# Patient Record
Sex: FEMALE | Race: WHITE | NOT HISPANIC OR LATINO | Employment: OTHER | URBAN - METROPOLITAN AREA
[De-identification: names, ages, dates, MRNs, and addresses within clinical notes are randomized per-mention and may not be internally consistent; named-entity substitution may affect disease eponyms.]

---

## 2023-09-30 ENCOUNTER — APPOINTMENT (OUTPATIENT)
Dept: CT IMAGING | Facility: HOSPITAL | Age: 88
DRG: 563 | End: 2023-09-30
Payer: MEDICARE

## 2023-09-30 ENCOUNTER — APPOINTMENT (OUTPATIENT)
Dept: GENERAL RADIOLOGY | Facility: HOSPITAL | Age: 88
DRG: 563 | End: 2023-09-30
Payer: MEDICARE

## 2023-09-30 ENCOUNTER — HOSPITAL ENCOUNTER (INPATIENT)
Facility: HOSPITAL | Age: 88
LOS: 4 days | Discharge: NURSING, REHAB OR LONG-TERM CARE | DRG: 563 | End: 2023-10-06
Attending: EMERGENCY MEDICINE | Admitting: INTERNAL MEDICINE
Payer: MEDICARE

## 2023-09-30 DIAGNOSIS — S42.291A OTHER CLOSED DISPLACED FRACTURE OF PROXIMAL END OF RIGHT HUMERUS, INITIAL ENCOUNTER: Primary | ICD-10-CM

## 2023-09-30 DIAGNOSIS — S09.90XA CLOSED HEAD INJURY, INITIAL ENCOUNTER: ICD-10-CM
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
## 2023-09-30 DIAGNOSIS — I10 UNCONTROLLED HYPERTENSION: ICD-10-CM

## 2023-09-30 PROBLEM — S42.209A PROXIMAL HUMERUS FRACTURE: Status: ACTIVE | Noted: 2023-09-30

## 2023-09-30 LAB
ALBUMIN SERPL-MCNC: 3.7 G/DL (ref 3.5–5.2)
ALBUMIN/GLOB SERPL: 1.2 G/DL
ALP SERPL-CCNC: 71 U/L (ref 39–117)
ALT SERPL W P-5'-P-CCNC: 10 U/L (ref 1–33)
ANION GAP SERPL CALCULATED.3IONS-SCNC: 10.3 MMOL/L (ref 5–15)
AST SERPL-CCNC: 16 U/L (ref 1–32)
BASOPHILS # BLD AUTO: 0.04 10*3/MM3 (ref 0–0.2)
BASOPHILS NFR BLD AUTO: 0.4 % (ref 0–1.5)
BILIRUB SERPL-MCNC: 0.3 MG/DL (ref 0–1.2)
BUN SERPL-MCNC: 21 MG/DL (ref 8–23)
BUN/CREAT SERPL: 15.3 (ref 7–25)
CALCIUM SPEC-SCNC: 8.8 MG/DL (ref 8.2–9.6)
CHLORIDE SERPL-SCNC: 105 MMOL/L (ref 98–107)
CO2 SERPL-SCNC: 25.7 MMOL/L (ref 22–29)
CREAT SERPL-MCNC: 1.37 MG/DL (ref 0.57–1)
DEPRECATED RDW RBC AUTO: 44.1 FL (ref 37–54)
EGFRCR SERPLBLD CKD-EPI 2021: 35.9 ML/MIN/1.73
EOSINOPHIL # BLD AUTO: 0.15 10*3/MM3 (ref 0–0.4)
EOSINOPHIL NFR BLD AUTO: 1.6 % (ref 0.3–6.2)
ERYTHROCYTE [DISTWIDTH] IN BLOOD BY AUTOMATED COUNT: 13.3 % (ref 12.3–15.4)
GLOBULIN UR ELPH-MCNC: 3.1 GM/DL
GLUCOSE SERPL-MCNC: 127 MG/DL (ref 65–99)
HCT VFR BLD AUTO: 40.8 % (ref 34–46.6)
HGB BLD-MCNC: 13.7 G/DL (ref 12–15.9)
IMM GRANULOCYTES # BLD AUTO: 0.05 10*3/MM3 (ref 0–0.05)
IMM GRANULOCYTES NFR BLD AUTO: 0.5 % (ref 0–0.5)
LYMPHOCYTES # BLD AUTO: 1.36 10*3/MM3 (ref 0.7–3.1)
LYMPHOCYTES NFR BLD AUTO: 14.5 % (ref 19.6–45.3)
MCH RBC QN AUTO: 30.3 PG (ref 26.6–33)
MCHC RBC AUTO-ENTMCNC: 33.6 G/DL (ref 31.5–35.7)
MCV RBC AUTO: 90.3 FL (ref 79–97)
MONOCYTES # BLD AUTO: 0.65 10*3/MM3 (ref 0.1–0.9)
MONOCYTES NFR BLD AUTO: 6.9 % (ref 5–12)
NEUTROPHILS NFR BLD AUTO: 7.16 10*3/MM3 (ref 1.7–7)
NEUTROPHILS NFR BLD AUTO: 76.1 % (ref 42.7–76)
NRBC BLD AUTO-RTO: 0 /100 WBC (ref 0–0.2)
PLATELET # BLD AUTO: 245 10*3/MM3 (ref 140–450)
PMV BLD AUTO: 10.4 FL (ref 6–12)
POTASSIUM SERPL-SCNC: 3.7 MMOL/L (ref 3.5–5.2)
PROT SERPL-MCNC: 6.8 G/DL (ref 6–8.5)
RBC # BLD AUTO: 4.52 10*6/MM3 (ref 3.77–5.28)
SODIUM SERPL-SCNC: 141 MMOL/L (ref 136–145)
WBC NRBC COR # BLD: 9.41 10*3/MM3 (ref 3.4–10.8)

## 2023-09-30 PROCEDURE — 80053 COMPREHEN METABOLIC PANEL: CPT | Performed by: PHYSICIAN ASSISTANT

## 2023-09-30 PROCEDURE — 72125 CT NECK SPINE W/O DYE: CPT

## 2023-09-30 PROCEDURE — G0378 HOSPITAL OBSERVATION PER HR: HCPCS

## 2023-09-30 PROCEDURE — 73030 X-RAY EXAM OF SHOULDER: CPT

## 2023-09-30 PROCEDURE — 25010000002 HYDROMORPHONE PER 4 MG: Performed by: INTERNAL MEDICINE

## 2023-09-30 PROCEDURE — 99285 EMERGENCY DEPT VISIT HI MDM: CPT

## 2023-09-30 PROCEDURE — 85025 COMPLETE CBC W/AUTO DIFF WBC: CPT | Performed by: PHYSICIAN ASSISTANT

## 2023-09-30 PROCEDURE — 73060 X-RAY EXAM OF HUMERUS: CPT

## 2023-09-30 PROCEDURE — 70450 CT HEAD/BRAIN W/O DYE: CPT

## 2023-09-30 RX ORDER — HYDROCODONE BITARTRATE AND ACETAMINOPHEN 5; 325 MG/1; MG/1
1 TABLET ORAL EVERY 4 HOURS PRN
Status: DISCONTINUED | OUTPATIENT
Start: 2023-09-30 | End: 2023-10-06 | Stop reason: HOSPADM

## 2023-09-30 RX ORDER — NALOXONE HCL 0.4 MG/ML
0.4 VIAL (ML) INJECTION
Status: DISCONTINUED | OUTPATIENT
Start: 2023-09-30 | End: 2023-10-02

## 2023-09-30 RX ORDER — HYDROCODONE BITARTRATE AND ACETAMINOPHEN 5; 325 MG/1; MG/1
1 TABLET ORAL ONCE
Status: COMPLETED | OUTPATIENT
Start: 2023-09-30 | End: 2023-09-30

## 2023-09-30 RX ORDER — ONDANSETRON 2 MG/ML
4 INJECTION INTRAMUSCULAR; INTRAVENOUS EVERY 6 HOURS PRN
Status: DISCONTINUED | OUTPATIENT
Start: 2023-09-30 | End: 2023-10-06 | Stop reason: HOSPADM

## 2023-09-30 RX ORDER — AMOXICILLIN 250 MG
2 CAPSULE ORAL 2 TIMES DAILY
Status: DISCONTINUED | OUTPATIENT
Start: 2023-09-30 | End: 2023-10-06 | Stop reason: HOSPADM

## 2023-09-30 RX ORDER — POLYETHYLENE GLYCOL 3350 17 G/17G
17 POWDER, FOR SOLUTION ORAL DAILY PRN
Status: DISCONTINUED | OUTPATIENT
Start: 2023-09-30 | End: 2023-10-06 | Stop reason: HOSPADM

## 2023-09-30 RX ORDER — BISACODYL 5 MG/1
5 TABLET, DELAYED RELEASE ORAL DAILY PRN
Status: DISCONTINUED | OUTPATIENT
Start: 2023-09-30 | End: 2023-10-06 | Stop reason: HOSPADM

## 2023-09-30 RX ORDER — UREA 10 %
3 LOTION (ML) TOPICAL NIGHTLY PRN
Status: DISCONTINUED | OUTPATIENT
Start: 2023-09-30 | End: 2023-10-06 | Stop reason: HOSPADM

## 2023-09-30 RX ORDER — SODIUM CHLORIDE 0.9 % (FLUSH) 0.9 %
10 SYRINGE (ML) INJECTION AS NEEDED
Status: DISCONTINUED | OUTPATIENT
Start: 2023-09-30 | End: 2023-10-06 | Stop reason: HOSPADM

## 2023-09-30 RX ORDER — SODIUM CHLORIDE 9 MG/ML
75 INJECTION, SOLUTION INTRAVENOUS CONTINUOUS
Status: DISCONTINUED | OUTPATIENT
Start: 2023-09-30 | End: 2023-09-30

## 2023-09-30 RX ORDER — ONDANSETRON 4 MG/1
4 TABLET, FILM COATED ORAL EVERY 6 HOURS PRN
Status: DISCONTINUED | OUTPATIENT
Start: 2023-09-30 | End: 2023-10-06 | Stop reason: HOSPADM

## 2023-09-30 RX ORDER — HYDRALAZINE HYDROCHLORIDE 50 MG/1
50 TABLET, FILM COATED ORAL 2 TIMES DAILY
COMMUNITY

## 2023-09-30 RX ORDER — HYDRALAZINE HYDROCHLORIDE 50 MG/1
50 TABLET, FILM COATED ORAL 2 TIMES DAILY
Status: DISCONTINUED | OUTPATIENT
Start: 2023-09-30 | End: 2023-10-01

## 2023-09-30 RX ORDER — HYDROMORPHONE HYDROCHLORIDE 1 MG/ML
0.5 INJECTION, SOLUTION INTRAMUSCULAR; INTRAVENOUS; SUBCUTANEOUS
Status: DISCONTINUED | OUTPATIENT
Start: 2023-09-30 | End: 2023-10-02

## 2023-09-30 RX ORDER — BISACODYL 10 MG
10 SUPPOSITORY, RECTAL RECTAL DAILY PRN
Status: DISCONTINUED | OUTPATIENT
Start: 2023-09-30 | End: 2023-10-06 | Stop reason: HOSPADM

## 2023-09-30 RX ORDER — ACETAMINOPHEN 325 MG/1
650 TABLET ORAL EVERY 4 HOURS PRN
Status: DISCONTINUED | OUTPATIENT
Start: 2023-09-30 | End: 2023-10-06 | Stop reason: HOSPADM

## 2023-09-30 RX ADMIN — HYDROCODONE BITARTRATE AND ACETAMINOPHEN 1 TABLET: 5; 325 TABLET ORAL at 19:07

## 2023-09-30 RX ADMIN — HYDROMORPHONE HYDROCHLORIDE 0.5 MG: 1 INJECTION, SOLUTION INTRAMUSCULAR; INTRAVENOUS; SUBCUTANEOUS at 23:28

## 2023-09-30 RX ADMIN — HYDRALAZINE HYDROCHLORIDE 50 MG: 50 TABLET, FILM COATED ORAL at 23:28

## 2023-09-30 NOTE — ED PROVIDER NOTES
MD ATTESTATION NOTE    The JACQUI and I have discussed this patient's history, physical exam, and treatment plan.  I have reviewed the documentation and personally had a face to face interaction with the patient. I affirm the documentation and agree with the treatment and plan.  The attached note describes my personal findings.      I provided a substantive portion of the care of the patient.  I personally performed the physical exam in its entirety, and below are my findings.      Brief HPI: 89-year-old who had a witnessed fall at her memory care unit.  She complains of right shoulder pain.  They are unsure if she hit her head.    General : 89-year-old patient is awake with significant dementia  HEENT: NCAT no C-spine tenderness  Ext: Swelling to the right shoulder with bruising to right humerus: The other 3 extremities have full range of motion without pain to movement or palpation  Skin: No rash  Neuro: Awake and demented with a nonfocal neuro exam  Psych: Normal mood and affect      Plan: We will CT head, C-spine, right shoulder and right humerus    1900: The patient's daughter just got here and states she only takes a baby aspirin a day but no blood thinners.      Patient CT head and C-spine are negative.  The patient's right shoulder film shows an impacted right proximal humerus fracture with angulation.  We will place the patient in a sling.  The patient does usually ambulate with a walker but I do not believe she will be able to do so with this type of fracture.  We will admit her to the hospital for orthopedics consultation as well as PT.  The patient and daughter understand and agree with the plan.     Nicola Esteban MD  09/30/23 2035

## 2023-09-30 NOTE — ED NOTES
Pt to ED from [facility] assisted living/memory care for witnessed fall, pt had a sweater hanging on her walker and tripped on it. Right shoulder pain, unsure if struck head

## 2023-09-30 NOTE — ED PROVIDER NOTES
EMERGENCY DEPARTMENT ENCOUNTER    Room Number:  02/02  PCP: Serina Hannon APRN  Discussed/ obtained information from independent historians: EMS and daughter      HPI:  Chief Complaint: Fall with right shoulder pain  A complete HPI/ROS/PMH/PSH/SH/FH are unobtainable due to: Dementia  Context: Joanna DEAL is a 89 y.o. female who presents to the ED c/o fall with right shoulder pain.  Patient presents from [facility].  She was walking with her walker and had her sweater hanging on the walker.  She tripped over the sweater and fell forward.  Unknown if patient hit head or have loss of consciousness.  Patient complains of right shoulder pain.  Daughter reports she takes a baby aspirin.  History otherwise limited secondary to dementia.      External (non-ED) record review:   Reviewed note from office visit with orthopedic surgery on 11/24/2021 where patient seen after partial hip replacement.  Reviewed assessment and plan.  Patient to continue physical therapy, follow-up for recheck.  Reviewed prior laboratory studies.  Most recent CBC with hemoglobin 14.5.  Most recent CMP with creatinine 1.25.      PAST MEDICAL HISTORY  Active Ambulatory Problems     Diagnosis Date Noted    No Active Ambulatory Problems     Resolved Ambulatory Problems     Diagnosis Date Noted    No Resolved Ambulatory Problems     No Additional Past Medical History         PAST SURGICAL HISTORY  No past surgical history on file.      FAMILY HISTORY  No family history on file.      SOCIAL HISTORY  Social History     Socioeconomic History    Marital status: Unknown         ALLERGIES  Cephalexin        REVIEW OF SYSTEMS  Review of Systems   Unable to perform ROS: Dementia          PHYSICAL EXAM  ED Triage Vitals [09/30/23 1835]   Temp Heart Rate Resp BP SpO2   97.2 °F (36.2 °C) 76 22 (!) 183/97 95 %      Temp src Heart Rate Source Patient Position BP Location FiO2 (%)   Tympanic Monitor Sitting Left arm --       Physical  Exam  Constitutional:       General: She is not in acute distress.     Appearance: She is well-developed.   HENT:      Head: Normocephalic and atraumatic.   Eyes:      Extraocular Movements: Extraocular movements intact.   Cardiovascular:      Rate and Rhythm: Normal rate and regular rhythm.      Pulses:           Radial pulses are 2+ on the right side and 2+ on the left side.        Dorsalis pedis pulses are 2+ on the right side and 2+ on the left side.      Heart sounds: Normal heart sounds.   Pulmonary:      Effort: Pulmonary effort is normal.      Breath sounds: Normal breath sounds.   Abdominal:      General: There is no distension.   Musculoskeletal:      Comments: Right shoulder and humerus are diffusely tender to palpation.  No deformity noted.   Skin:     General: Skin is warm.   Neurological:      General: No focal deficit present.      Mental Status: She is alert and oriented to person, place, and time.   Psychiatric:         Mood and Affect: Mood normal.         Vital signs and nursing notes reviewed.          LAB RESULTS  Recent Results (from the past 24 hour(s))   Comprehensive Metabolic Panel    Collection Time: 09/30/23  8:33 PM    Specimen: Blood   Result Value Ref Range    Glucose 127 (H) 65 - 99 mg/dL    BUN 21 8 - 23 mg/dL    Creatinine 1.37 (H) 0.57 - 1.00 mg/dL    Sodium 141 136 - 145 mmol/L    Potassium 3.7 3.5 - 5.2 mmol/L    Chloride 105 98 - 107 mmol/L    CO2 25.7 22.0 - 29.0 mmol/L    Calcium 8.8 8.2 - 9.6 mg/dL    Total Protein 6.8 6.0 - 8.5 g/dL    Albumin 3.7 3.5 - 5.2 g/dL    ALT (SGPT) 10 1 - 33 U/L    AST (SGOT) 16 1 - 32 U/L    Alkaline Phosphatase 71 39 - 117 U/L    Total Bilirubin 0.3 0.0 - 1.2 mg/dL    Globulin 3.1 gm/dL    A/G Ratio 1.2 g/dL    BUN/Creatinine Ratio 15.3 7.0 - 25.0    Anion Gap 10.3 5.0 - 15.0 mmol/L    eGFR 35.9 (L) >60.0 mL/min/1.73   CBC Auto Differential    Collection Time: 09/30/23  8:33 PM    Specimen: Blood   Result Value Ref Range    WBC 9.41 3.40 - 10.80  10*3/mm3    RBC 4.52 3.77 - 5.28 10*6/mm3    Hemoglobin 13.7 12.0 - 15.9 g/dL    Hematocrit 40.8 34.0 - 46.6 %    MCV 90.3 79.0 - 97.0 fL    MCH 30.3 26.6 - 33.0 pg    MCHC 33.6 31.5 - 35.7 g/dL    RDW 13.3 12.3 - 15.4 %    RDW-SD 44.1 37.0 - 54.0 fl    MPV 10.4 6.0 - 12.0 fL    Platelets 245 140 - 450 10*3/mm3    Neutrophil % 76.1 (H) 42.7 - 76.0 %    Lymphocyte % 14.5 (L) 19.6 - 45.3 %    Monocyte % 6.9 5.0 - 12.0 %    Eosinophil % 1.6 0.3 - 6.2 %    Basophil % 0.4 0.0 - 1.5 %    Immature Grans % 0.5 0.0 - 0.5 %    Neutrophils, Absolute 7.16 (H) 1.70 - 7.00 10*3/mm3    Lymphocytes, Absolute 1.36 0.70 - 3.10 10*3/mm3    Monocytes, Absolute 0.65 0.10 - 0.90 10*3/mm3    Eosinophils, Absolute 0.15 0.00 - 0.40 10*3/mm3    Basophils, Absolute 0.04 0.00 - 0.20 10*3/mm3    Immature Grans, Absolute 0.05 0.00 - 0.05 10*3/mm3    nRBC 0.0 0.0 - 0.2 /100 WBC       Ordered the above labs and reviewed the results.        RADIOLOGY  XR Shoulder 2+ View Right, XR Humerus Right    Result Date: 9/30/2023  X-RAY OF THE RIGHT HUMERUS; 2 VIEWS RIGHT SHOULDER  HISTORY: Pain  COMPARISON: None available.  FINDINGS: The patient has an impacted fracture of the proximal right humerus. No fracture of the distal humerus is seen. There is adjacent soft tissue swelling. Degenerative changes are noted at the right AC joint.      Impacted proximal right humeral fracture.  This report was finalized on 9/30/2023 7:45 PM by Dr. Katty Simmons M.D.      CT Head Without Contrast, CT Cervical Spine Without Contrast    Result Date: 9/30/2023  EXAM: CT HEAD WO CONTRAST-, CT CERVICAL SPINE WO CONTRAST-  CLINICAL HISTORY: Fall with possible head injury  TECHNIQUE: CT scan of the head was obtained with 3 mm axial soft tissue algorithm images. No intravenous contrast was administered. Sagittal and coronal reconstructions were obtained.  COMPARISON: None available.  FINDINGS: Tiny right frontal subcutaneous hematoma. No underlying calvarial fracture or  intracranial hemorrhage. Generalized cortical involution without lobar predominance. Ex vacuo dilatation of the lateral and third ventricles without hydrocephalus. Confluent periventricular and subcortical white matter hypoattenuation without mass effect, suggesting advanced small vessel ischemic changes. No CT evidence of acute territorial infarction. No midline shift or mass effect. No extra-axial collections. Clear visualized portions of the paranasal sinuses and mastoid air cells.  Straightening of the normal cervical lordosis. Slight C4 on C5 anterolisthesis. Vertebral body heights are maintained without fracture. Moderate and advanced disc degeneration throughout the cervical spine, advanced at C5-C7. Bilateral multilevel moderate advanced facet arthropathy. No appreciable spinal canal stenosis. No prevertebral soft tissue swelling.       1. Tiny right frontal subcutaneous hematoma. No underlying calvarial fracture or intracranial hemorrhage. 2. No cervical spine fracture or traumatic subluxation. Degenerative changes as above.     Radiation dose reduction techniques were utilized, including automated exposure control and exposure modulation based on body size.  This report was finalized on 9/30/2023 8:01 PM by Dr. Panfilo Bravo M.D.       Ordered the above noted radiological studies. Reviewed by me in PACS.              MEDICATIONS GIVEN IN ER  Medications   HYDROcodone-acetaminophen (NORCO) 5-325 MG per tablet 1 tablet (has no administration in time range)             MEDICAL DECISION MAKING, PROGRESS, and CONSULTS    All labs have been independently reviewed by me.  All radiology studies have been reviewed by me and I have also reviewed the radiology report.   EKG's independently viewed and interpreted by me.  Discussion below represents my analysis of pertinent findings related to patient's condition, differential diagnosis, treatment plan and final disposition.            Orders placed during this  visit:  Orders Placed This Encounter   Procedures    CT Head Without Contrast    CT Cervical Spine Without Contrast    XR Shoulder 2+ View Right    XR Humerus Right           Differential diagnosis:  Right shoulder fracture, right shoulder dislocation, right elbow fracture, closed head injury, intracranial hemorrhage      Independent interpretation of labs, radiology studies, and discussions with consultants:  ED Course as of 10/01/23 0956   Sat Sep 30, 2023   1925 X-ray right shoulder independently interpreted me as proximal right humerus fracture [MP]   1926 X-ray of humerus independently interpreted by me as displaced proximal right humerus fracture [MP]   1935 CT scan of head independently inter by me as no acute hemorrhage [MP]   1936 CT scan of cervical spine independently interpreted by me as no acute fracture [MP]   2015 I spoke with Dr. Dutton with [facility].  Reviewed patient presentation and ED findings.  She agrees admit patient to a [facility] bed. [MP]      ED Course User Index  [MP] Maame Ram PA-C         Additional orders considered but not ordered:  CT of thoracic spine      Additional sources:    - Chronic or social conditions impacting care: Dementia          DIAGNOSIS  Final diagnoses:   Other closed displaced fracture of proximal end of right humerus, initial encounter   Fall, initial encounter   Closed head injury, initial encounter   Uncontrolled hypertension             Latest Documented Vital Signs:  As of 19:00 EDT  BP- (!) 183/97 HR- 76 Temp- 97.2 °F (36.2 °C) (Tympanic) O2 sat- 95%              --    Please note that portions of this were completed with a voice recognition program.       Note Disclaimer: At [facility], we believe that sharing information builds trust and better relationships. You are receiving this note because you are receiving care at [facility] or recently visited. It is possible you will see health information before a provider has talked with you about it.  This kind of information can be easy to misunderstand. To help you fully understand what it means for your health, we urge you to discuss this note with your provider.             Maame Ram PA-C  10/01/23 0959

## 2023-10-01 PROBLEM — M25.521 PAIN IN JOINT, UPPER ARM, RIGHT: Status: ACTIVE | Noted: 2023-10-01

## 2023-10-01 PROBLEM — I10 HTN (HYPERTENSION): Status: ACTIVE | Noted: 2023-10-01

## 2023-10-01 PROBLEM — F03.918 DEMENTIA WITH BEHAVIORAL DISTURBANCE: Status: ACTIVE | Noted: 2023-10-01

## 2023-10-01 LAB
ANION GAP SERPL CALCULATED.3IONS-SCNC: 10 MMOL/L (ref 5–15)
BUN SERPL-MCNC: 21 MG/DL (ref 8–23)
BUN/CREAT SERPL: 17.6 (ref 7–25)
CALCIUM SPEC-SCNC: 8.6 MG/DL (ref 8.2–9.6)
CHLORIDE SERPL-SCNC: 107 MMOL/L (ref 98–107)
CO2 SERPL-SCNC: 23 MMOL/L (ref 22–29)
CREAT SERPL-MCNC: 1.19 MG/DL (ref 0.57–1)
DEPRECATED RDW RBC AUTO: 44.2 FL (ref 37–54)
EGFRCR SERPLBLD CKD-EPI 2021: 42.5 ML/MIN/1.73
ERYTHROCYTE [DISTWIDTH] IN BLOOD BY AUTOMATED COUNT: 13.2 % (ref 12.3–15.4)
GLUCOSE SERPL-MCNC: 123 MG/DL (ref 65–99)
HCT VFR BLD AUTO: 36.9 % (ref 34–46.6)
HGB BLD-MCNC: 12.4 G/DL (ref 12–15.9)
MCH RBC QN AUTO: 30.5 PG (ref 26.6–33)
MCHC RBC AUTO-ENTMCNC: 33.6 G/DL (ref 31.5–35.7)
MCV RBC AUTO: 90.7 FL (ref 79–97)
PLATELET # BLD AUTO: 238 10*3/MM3 (ref 140–450)
PMV BLD AUTO: 10.7 FL (ref 6–12)
POTASSIUM SERPL-SCNC: 3.8 MMOL/L (ref 3.5–5.2)
RBC # BLD AUTO: 4.07 10*6/MM3 (ref 3.77–5.28)
SODIUM SERPL-SCNC: 140 MMOL/L (ref 136–145)
WBC NRBC COR # BLD: 8.82 10*3/MM3 (ref 3.4–10.8)

## 2023-10-01 PROCEDURE — 80048 BASIC METABOLIC PNL TOTAL CA: CPT | Performed by: INTERNAL MEDICINE

## 2023-10-01 PROCEDURE — 25010000002 HYDROMORPHONE PER 4 MG: Performed by: INTERNAL MEDICINE

## 2023-10-01 PROCEDURE — 36415 COLL VENOUS BLD VENIPUNCTURE: CPT | Performed by: INTERNAL MEDICINE

## 2023-10-01 PROCEDURE — G0378 HOSPITAL OBSERVATION PER HR: HCPCS

## 2023-10-01 PROCEDURE — 25010000002 HYDRALAZINE PER 20 MG: Performed by: INTERNAL MEDICINE

## 2023-10-01 PROCEDURE — 85027 COMPLETE CBC AUTOMATED: CPT | Performed by: INTERNAL MEDICINE

## 2023-10-01 PROCEDURE — 25010000002 ENOXAPARIN PER 10 MG: Performed by: HOSPITALIST

## 2023-10-01 RX ORDER — DILTIAZEM HYDROCHLORIDE 60 MG/1
120 TABLET, FILM COATED ORAL DAILY
Status: DISCONTINUED | OUTPATIENT
Start: 2023-10-02 | End: 2023-10-06 | Stop reason: HOSPADM

## 2023-10-01 RX ORDER — LEVOTHYROXINE SODIUM 0.05 MG/1
50 TABLET ORAL DAILY
COMMUNITY

## 2023-10-01 RX ORDER — DOCUSATE SODIUM 100 MG/1
100 CAPSULE, LIQUID FILLED ORAL 2 TIMES DAILY
Status: DISCONTINUED | OUTPATIENT
Start: 2023-10-01 | End: 2023-10-06 | Stop reason: HOSPADM

## 2023-10-01 RX ORDER — DOCUSATE SODIUM 100 MG/1
100 CAPSULE, LIQUID FILLED ORAL 2 TIMES DAILY
COMMUNITY

## 2023-10-01 RX ORDER — DILTIAZEM HYDROCHLORIDE 120 MG/1
120 CAPSULE, EXTENDED RELEASE ORAL 2 TIMES DAILY
COMMUNITY

## 2023-10-01 RX ORDER — ASPIRIN 81 MG/1
81 TABLET ORAL NIGHTLY
COMMUNITY

## 2023-10-01 RX ORDER — ATORVASTATIN CALCIUM 80 MG/1
80 TABLET, FILM COATED ORAL NIGHTLY
COMMUNITY

## 2023-10-01 RX ORDER — LOSARTAN POTASSIUM 25 MG/1
25 TABLET ORAL
Status: DISCONTINUED | OUTPATIENT
Start: 2023-10-01 | End: 2023-10-06 | Stop reason: HOSPADM

## 2023-10-01 RX ORDER — FAMOTIDINE 20 MG/1
20 TABLET, FILM COATED ORAL DAILY
Status: DISCONTINUED | OUTPATIENT
Start: 2023-10-02 | End: 2023-10-06 | Stop reason: HOSPADM

## 2023-10-01 RX ORDER — ATORVASTATIN CALCIUM 20 MG/1
80 TABLET, FILM COATED ORAL NIGHTLY
Status: DISCONTINUED | OUTPATIENT
Start: 2023-10-01 | End: 2023-10-06 | Stop reason: HOSPADM

## 2023-10-01 RX ORDER — FAMOTIDINE 20 MG/1
20 TABLET, FILM COATED ORAL
Status: DISCONTINUED | OUTPATIENT
Start: 2023-10-01 | End: 2023-10-01

## 2023-10-01 RX ORDER — HYDRALAZINE HYDROCHLORIDE 25 MG/1
25 TABLET, FILM COATED ORAL EVERY 6 HOURS SCHEDULED
Status: DISCONTINUED | OUTPATIENT
Start: 2023-10-01 | End: 2023-10-04

## 2023-10-01 RX ORDER — ENOXAPARIN SODIUM 100 MG/ML
30 INJECTION SUBCUTANEOUS EVERY 24 HOURS
Status: DISCONTINUED | OUTPATIENT
Start: 2023-10-01 | End: 2023-10-06 | Stop reason: HOSPADM

## 2023-10-01 RX ORDER — LEVOTHYROXINE SODIUM 0.05 MG/1
50 TABLET ORAL DAILY
Status: DISCONTINUED | OUTPATIENT
Start: 2023-10-01 | End: 2023-10-06 | Stop reason: HOSPADM

## 2023-10-01 RX ORDER — ASPIRIN 81 MG/1
81 TABLET ORAL NIGHTLY
Status: DISCONTINUED | OUTPATIENT
Start: 2023-10-01 | End: 2023-10-06 | Stop reason: HOSPADM

## 2023-10-01 RX ORDER — DILTIAZEM HYDROCHLORIDE 240 MG/1
240 CAPSULE, COATED, EXTENDED RELEASE ORAL
Status: DISCONTINUED | OUTPATIENT
Start: 2023-10-01 | End: 2023-10-01

## 2023-10-01 RX ORDER — HYDRALAZINE HYDROCHLORIDE 20 MG/ML
10 INJECTION INTRAMUSCULAR; INTRAVENOUS ONCE
Status: COMPLETED | OUTPATIENT
Start: 2023-10-01 | End: 2023-10-01

## 2023-10-01 RX ADMIN — ASPIRIN 81 MG: 81 TABLET, COATED ORAL at 20:00

## 2023-10-01 RX ADMIN — HYDRALAZINE HYDROCHLORIDE 25 MG: 25 TABLET ORAL at 12:09

## 2023-10-01 RX ADMIN — HYDROMORPHONE HYDROCHLORIDE 0.5 MG: 1 INJECTION, SOLUTION INTRAMUSCULAR; INTRAVENOUS; SUBCUTANEOUS at 05:43

## 2023-10-01 RX ADMIN — FAMOTIDINE 20 MG: 20 TABLET, FILM COATED ORAL at 08:55

## 2023-10-01 RX ADMIN — SENNOSIDES AND DOCUSATE SODIUM 2 TABLET: 50; 8.6 TABLET ORAL at 08:55

## 2023-10-01 RX ADMIN — ATORVASTATIN CALCIUM 80 MG: 20 TABLET, FILM COATED ORAL at 20:00

## 2023-10-01 RX ADMIN — LEVOTHYROXINE SODIUM 50 MCG: 50 TABLET ORAL at 18:12

## 2023-10-01 RX ADMIN — HYDRALAZINE HYDROCHLORIDE 25 MG: 25 TABLET ORAL at 18:12

## 2023-10-01 RX ADMIN — HYDROCODONE BITARTRATE AND ACETAMINOPHEN 1 TABLET: 5; 325 TABLET ORAL at 18:18

## 2023-10-01 RX ADMIN — DOCUSATE SODIUM 100 MG: 100 CAPSULE, LIQUID FILLED ORAL at 20:00

## 2023-10-01 RX ADMIN — LOSARTAN POTASSIUM 25 MG: 25 TABLET, FILM COATED ORAL at 12:09

## 2023-10-01 RX ADMIN — HYDROCODONE BITARTRATE AND ACETAMINOPHEN 1 TABLET: 5; 325 TABLET ORAL at 13:49

## 2023-10-01 RX ADMIN — HYDROCODONE BITARTRATE AND ACETAMINOPHEN 1 TABLET: 5; 325 TABLET ORAL at 08:55

## 2023-10-01 RX ADMIN — ENOXAPARIN SODIUM 30 MG: 100 INJECTION SUBCUTANEOUS at 08:55

## 2023-10-01 RX ADMIN — HYDRALAZINE HYDROCHLORIDE 10 MG: 20 INJECTION, SOLUTION INTRAMUSCULAR; INTRAVENOUS at 07:00

## 2023-10-01 RX ADMIN — HYDROCODONE BITARTRATE AND ACETAMINOPHEN 1 TABLET: 5; 325 TABLET ORAL at 22:42

## 2023-10-01 RX ADMIN — HYDROMORPHONE HYDROCHLORIDE 0.5 MG: 1 INJECTION, SOLUTION INTRAMUSCULAR; INTRAVENOUS; SUBCUTANEOUS at 03:35

## 2023-10-01 RX ADMIN — SENNOSIDES AND DOCUSATE SODIUM 2 TABLET: 50; 8.6 TABLET ORAL at 20:00

## 2023-10-01 NOTE — PLAN OF CARE
Goal Outcome Evaluation:  Plan of Care Reviewed With: patient        Progress: improving  Outcome Evaluation: BP improving with addition of orals, home meds reordered, oriented to self only (baseline), norco for pain, will need snf, PT/OT evals ordered, educated on bp meds and monitoring

## 2023-10-01 NOTE — PROGRESS NOTES
"    DAILY PROGRESS NOTE  [facility]    Patient Identification:  Name: Joanna Johnson  Age: 89 y.o.  Sex: female  :  1929  MRN: [id number]         Primary Care Physician: Serina Hannon APRN    Subjective:  Interval History: History and ROS completely unreliable given patient's pronounced dementia.  She looks at me blankly and tries to have some small talk at times.  Definitely quite confused and I spent more time trying to  and reassure her.  Also present side of the bed up to prevent falls and discussed with RN.  No family currently present at bedside.    Objective:    Scheduled Meds:hydrALAZINE, 25 mg, Oral, Q6H  losartan, 25 mg, Oral, Q24H  senna-docusate sodium, 2 tablet, Oral, BID      Continuous Infusions:     Vital signs in last 24 hours:  Temp:  [97.2 °F (36.2 °C)-98.8 °F (37.1 °C)] 97.6 °F (36.4 °C)  Heart Rate:  [55-76] 69  Resp:  [16-22] 16  BP: (133-190)/() 190/88    Intake/Output:    Intake/Output Summary (Last 24 hours) at 10/1/2023 0829  Last data filed at 10/1/2023 0546  Gross per 24 hour   Intake 60 ml   Output 200 ml   Net -140 ml       Exam:  BP (!) 190/88 (BP Location: Left arm, Patient Position: Lying) Comment: RN notified  Pulse 69   Temp 97.6 °F (36.4 °C) (Oral)   Resp 16   Ht 160 cm (63\")   Wt 62.6 kg (138 lb)   SpO2 96%   BMI 24.45 kg/m²     General Appearance:    Alert, cooperative minimally hampered by mentation, advanced dementia                          Head:    Normocephalic, without obvious abnormality, atraumatic                           Eyes:    PERRL, conjunctivae/corneas clear, EOM's intact, both eyes                         Throat:   Oral mucosa pink and moist                           Neck:   Supple, no TTP or JVD                         Lungs:    Clear to auscultation bilaterally, respirations unlabored                 Chest Wall:    No tenderness or deformity                          Heart:    Regular rate and rhythm, S1 and S2 " normal                  Abdomen:     Soft, nontender, bowel sounds active                 Extremities: Ecchymosis and swelling present with some tenderness to palpation to the right upper extremity, no cyanosis with good pulses                  Neurologic:   CNII-XII intact       Data Review:  Labs in chart were reviewed.    Assessment:  Active Hospital Problems    Diagnosis  POA    **Proximal humerus fracture [S42.209A]  Yes    Pain in joint, upper arm, right [M25.521]  Unknown    Dementia with behavioral disturbance [F03.918]  Unknown    HTN (hypertension) [I10]  Unknown      Resolved Hospital Problems   No resolved problems to display.       Plan:    Right proximal humerus fracture with impaction   -Orthopedics has evaluated -no surgical plans noted   -Discussed increased fall precautions and pain management with RN -transition to p.o. and reserve IV for breakthrough   -Hgb normal at 12.4 -need some formal prophylaxis and will use low-dose Lovenox      HTN   -Labile due to pain   -Avoid over control   -Add losartan 25 mg for now and change hydralazine to 25 mg Q8      Pronounced dementia is definitely going to make management of this fracture much more difficult as well as working with physical therapy      CKD 3a -mild/monitor    Add Pepcid for GI prophylaxis        Disposition -will need assistance of CCP for postoperative rehab/long-term care -PT/OT pending    RN to work on home MAR with facility    Surgery reached out to daughter unsuccessfully today and I will try again tomorrow      Addendum - home MAR R/R - parameters written on Diltiazem - prefer home med supply to avoid normal regimen - RN to inquire if possible     Jasson Willson MD  10/1/2023  08:29 EDT

## 2023-10-01 NOTE — H&P
HISTORY AND PHYSICAL   [facility]        Date of Admission: 2023  Patient Identification:  Name: Joanna Johnson  Age: 89 y.o.  Sex: female  :  1929  MRN: [id number]                     Primary Care Physician: Serina Hannon APRN    Chief Complaint:  89 year old female was sent in from her memory unit after a fall; her sweater was tangled in her walker causing her to lose her balance; she has a history of dementia; a daughter is present; she is having some pain of her right arm    History of Present Illness:   As above    Past Medical History:  Past Medical History:   Diagnosis Date    Hepatitis     Hypertension     Stroke      Past Surgical History:  History reviewed. No pertinent surgical history.   Home Meds:  Medications Prior to Admission   Medication Sig Dispense Refill Last Dose    hydrALAZINE (APRESOLINE) 50 MG tablet Take 1 tablet by mouth 2 (Two) Times a Day.          Allergies:  Allergies   Allergen Reactions    Cephalexin Rash     Extensive morbilliform rash     Immunizations:    There is no immunization history on file for this patient.  Social History:   Social History     Social History Narrative    Not on file     Social History     Socioeconomic History    Marital status:    Tobacco Use    Smoking status: Former     Types: Cigarettes     Passive exposure: Past    Smokeless tobacco: Never   Vaping Use    Vaping Use: Unknown   Substance and Sexual Activity    Alcohol use: Not Currently    Drug use: Never    Sexual activity: Defer       Family History:  History reviewed. No pertinent family history.     Review of Systems  Not obtainable from the patient  Objective:  T Max 24 hrs: Temp (24hrs), Av.7 °F (36.5 °C), Min:97.2 °F (36.2 °C), Max:98.2 °F (36.8 °C)    Vitals Ranges:   Temp:  [97.2 °F (36.2 °C)-98.2 °F (36.8 °C)] 98.2 °F (36.8 °C)  Heart Rate:  [55-76] 62  Resp:  [18-22] 18  BP: (183-187)/() 185/97      Exam:  BP (!) 185/97 (BP Location: Left arm,  "Patient Position: Lying)   Pulse 62   Temp 98.2 °F (36.8 °C) (Axillary)   Resp 18   Ht 160 cm (63\")   Wt 62.6 kg (138 lb)   SpO2 96%   BMI 24.45 kg/m²     General Appearance:    Alert, cooperative, no distress, appears stated age   Head:    Normocephalic, without obvious abnormality, atraumatic   Eyes:    PERRL, conjunctivae/corneas clear, EOM's intact, both eyes   Ears:    Normal external ear canals, both ears   Nose:   Nares normal, septum midline, mucosa normal, no drainage    or sinus tenderness   Throat:   Lips, mucosa, and tongue normal   Neck:   Supple, symmetrical, trachea midline, no adenopathy;     thyroid:  no enlargement/tenderness/nodules; no carotid    bruit or JVD   Back:     Symmetric, no curvature, ROM normal, no CVA tenderness   Lungs:     Decreased breath sounds bilaterally, respirations unlabored   Chest Wall:    No tenderness or deformity    Heart:    Regular rate and rhythm, S1 and S2 normal, no murmur, rub   or gallop   Abdomen:     Soft, nontender, bowel sounds active all four quadrants,     no masses, no hepatomegaly, no splenomegaly   Extremities:   Extremities normal, atraumatic, bruising right upper arm      .    Data Review:  Labs in chart were reviewed.  WBC   Date Value Ref Range Status   09/30/2023 9.41 3.40 - 10.80 10*3/mm3 Final     Hemoglobin   Date Value Ref Range Status   09/30/2023 13.7 12.0 - 15.9 g/dL Final     Hematocrit   Date Value Ref Range Status   09/30/2023 40.8 34.0 - 46.6 % Final     Platelets   Date Value Ref Range Status   09/30/2023 245 140 - 450 10*3/mm3 Final     Sodium   Date Value Ref Range Status   09/30/2023 141 136 - 145 mmol/L Final     Potassium   Date Value Ref Range Status   09/30/2023 3.7 3.5 - 5.2 mmol/L Final     Chloride   Date Value Ref Range Status   09/30/2023 105 98 - 107 mmol/L Final     CO2   Date Value Ref Range Status   09/30/2023 25.7 22.0 - 29.0 mmol/L Final     BUN   Date Value Ref Range Status   09/30/2023 21 8 - 23 mg/dL Final "     Creatinine   Date Value Ref Range Status   09/30/2023 1.37 (H) 0.57 - 1.00 mg/dL Final     Glucose   Date Value Ref Range Status   09/30/2023 127 (H) 65 - 99 mg/dL Final     Calcium   Date Value Ref Range Status   09/30/2023 8.8 8.2 - 9.6 mg/dL Final     AST (SGOT)   Date Value Ref Range Status   09/30/2023 16 1 - 32 U/L Final     ALT (SGPT)   Date Value Ref Range Status   09/30/2023 10 1 - 33 U/L Final     Alkaline Phosphatase   Date Value Ref Range Status   09/30/2023 71 39 - 117 U/L Final                Imaging Results (All)       Procedure Component Value Units Date/Time    CT Head Without Contrast [[id number]] Collected: 09/30/23 1940     Updated: 09/30/23 2004    Narrative:      EXAM: CT HEAD WO CONTRAST-, CT CERVICAL SPINE WO CONTRAST-     CLINICAL HISTORY: Fall with possible head injury     TECHNIQUE: CT scan of the head was obtained with 3 mm axial soft tissue  algorithm images. No intravenous contrast was administered. Sagittal and  coronal reconstructions were obtained.     COMPARISON: None available.     FINDINGS: Tiny right frontal subcutaneous hematoma. No underlying  calvarial fracture or intracranial hemorrhage. Generalized cortical  involution without lobar predominance. Ex vacuo dilatation of the  lateral and third ventricles without hydrocephalus. Confluent  periventricular and subcortical white matter hypoattenuation without  mass effect, suggesting advanced small vessel ischemic changes. No CT  evidence of acute territorial infarction. No midline shift or mass  effect. No extra-axial collections. Clear visualized portions of the  paranasal sinuses and mastoid air cells.     Straightening of the normal cervical lordosis. Slight C4 on C5  anterolisthesis. Vertebral body heights are maintained without fracture.  Moderate and advanced disc degeneration throughout the cervical spine,  advanced at C5-C7. Bilateral multilevel moderate advanced facet  arthropathy. No appreciable spinal canal  stenosis. No prevertebral soft  tissue swelling.           Impression:      1. Tiny right frontal subcutaneous hematoma. No underlying calvarial  fracture or intracranial hemorrhage.  2. No cervical spine fracture or traumatic subluxation. Degenerative  changes as above.              Radiation dose reduction techniques were utilized, including automated  exposure control and exposure modulation based on body size.     This report was finalized on 9/30/2023 8:01 PM by Dr. Panfilo Bravo M.D.       CT Cervical Spine Without Contrast [[id number]] Collected: 09/30/23 1940     Updated: 09/30/23 2004    Narrative:      EXAM: CT HEAD WO CONTRAST-, CT CERVICAL SPINE WO CONTRAST-     CLINICAL HISTORY: Fall with possible head injury     TECHNIQUE: CT scan of the head was obtained with 3 mm axial soft tissue  algorithm images. No intravenous contrast was administered. Sagittal and  coronal reconstructions were obtained.     COMPARISON: None available.     FINDINGS: Tiny right frontal subcutaneous hematoma. No underlying  calvarial fracture or intracranial hemorrhage. Generalized cortical  involution without lobar predominance. Ex vacuo dilatation of the  lateral and third ventricles without hydrocephalus. Confluent  periventricular and subcortical white matter hypoattenuation without  mass effect, suggesting advanced small vessel ischemic changes. No CT  evidence of acute territorial infarction. No midline shift or mass  effect. No extra-axial collections. Clear visualized portions of the  paranasal sinuses and mastoid air cells.     Straightening of the normal cervical lordosis. Slight C4 on C5  anterolisthesis. Vertebral body heights are maintained without fracture.  Moderate and advanced disc degeneration throughout the cervical spine,  advanced at C5-C7. Bilateral multilevel moderate advanced facet  arthropathy. No appreciable spinal canal stenosis. No prevertebral soft  tissue swelling.           Impression:      1.  Tiny right frontal subcutaneous hematoma. No underlying calvarial  fracture or intracranial hemorrhage.  2. No cervical spine fracture or traumatic subluxation. Degenerative  changes as above.              Radiation dose reduction techniques were utilized, including automated  exposure control and exposure modulation based on body size.     This report was finalized on 9/30/2023 8:01 PM by Dr. Panfilo Bravo M.D.       XR Shoulder 2+ View Right [[id number]] Collected: 09/30/23 1943     Updated: 09/30/23 1948    Narrative:      X-RAY OF THE RIGHT HUMERUS; 2 VIEWS RIGHT SHOULDER     HISTORY: Pain     COMPARISON: None available.     FINDINGS:  The patient has an impacted fracture of the proximal right humerus. No  fracture of the distal humerus is seen. There is adjacent soft tissue  swelling. Degenerative changes are noted at the right AC joint.       Impression:      Impacted proximal right humeral fracture.     This report was finalized on 9/30/2023 7:45 PM by Dr. Katty Simmons M.D.       XR Humerus Right [[id number]] Collected: 09/30/23 1943     Updated: 09/30/23 1948    Narrative:      X-RAY OF THE RIGHT HUMERUS; 2 VIEWS RIGHT SHOULDER     HISTORY: Pain     COMPARISON: None available.     FINDINGS:  The patient has an impacted fracture of the proximal right humerus. No  fracture of the distal humerus is seen. There is adjacent soft tissue  swelling. Degenerative changes are noted at the right AC joint.       Impression:      Impacted proximal right humeral fracture.     This report was finalized on 9/30/2023 7:45 PM by Dr. Katty Simmons M.D.                 Assessment:  Active Hospital Problems    Diagnosis  POA    **Proximal humerus fracture [S42.209A]  Yes      Resolved Hospital Problems   No resolved problems to display.   Fall  Hypertension  Hyperglycemia  Ckd3    Plan:  Will ask ortho to see  She will likely require a higher level of care  She was having difficulty using her walker prior to this  fall  Monitor blood sugar   patient, family and ed providers    Jesi Dutton MD  9/30/2023  22:52 EDT

## 2023-10-01 NOTE — ED NOTES
"Nursing report ED to floor  Joanna Johnson  89 y.o.  female    HPI :   Chief Complaint   Patient presents with    Shoulder Pain    Fall       Admitting doctor:   Jesi Dutton MD    Admitting diagnosis:   The primary encounter diagnosis was Other closed displaced fracture of proximal end of right humerus, initial encounter. Diagnoses of Fall, initial encounter, Closed head injury, initial encounter, and Uncontrolled hypertension were also pertinent to this visit.    Code status:   Current Code Status       Date Active Code Status Order ID Comments User Context       Not on file            Allergies:   Cephalexin    Isolation:   No active isolations    Intake and Output  No intake or output data in the 24 hours ending 09/30/23 2037    Weight:       09/30/23 1837   Weight: 62.6 kg (138 lb)       Most recent vitals:   Vitals:    09/30/23 1835 09/30/23 1837   BP: (!) 183/97    BP Location: Left arm    Patient Position: Sitting    Pulse: 76    Resp: 22    Temp: 97.2 °F (36.2 °C)    TempSrc: Tympanic    SpO2: 95%    Weight:  62.6 kg (138 lb)   Height: 160 cm (63\")        Active LDAs/IV Access:   Lines, Drains & Airways       Active LDAs       Name Placement date Placement time Site Days    Peripheral IV 09/30/23 2037 Anterior;Left;Proximal Forearm 09/30/23 2037  Forearm  less than 1                    Labs (abnormal labs have a star):   Labs Reviewed   COMPREHENSIVE METABOLIC PANEL   CBC WITH AUTO DIFFERENTIAL   CBC AND DIFFERENTIAL    Narrative:     The following orders were created for panel order CBC & Differential.  Procedure                               Abnormality         Status                     ---------                               -----------         ------                     CBC Auto Differential[[id number]]                            In process                   Please view results for these tests on the individual orders.       EKG:   No orders to display       Meds given in ED: "   Medications   sodium chloride 0.9 % flush 10 mL (has no administration in time range)   HYDROcodone-acetaminophen (NORCO) 5-325 MG per tablet 1 tablet (1 tablet Oral Given 9/30/23 1907)       Imaging results:  XR Shoulder 2+ View Right    Result Date: 9/30/2023  Impacted proximal right humeral fracture.  This report was finalized on 9/30/2023 7:45 PM by Dr. Katty Simmons M.D.      XR Humerus Right    Result Date: 9/30/2023  Impacted proximal right humeral fracture.  This report was finalized on 9/30/2023 7:45 PM by Dr. Katty Simmons M.D.      CT Head Without Contrast    Result Date: 9/30/2023  1. Tiny right frontal subcutaneous hematoma. No underlying calvarial fracture or intracranial hemorrhage. 2. No cervical spine fracture or traumatic subluxation. Degenerative changes as above.     Radiation dose reduction techniques were utilized, including automated exposure control and exposure modulation based on body size.  This report was finalized on 9/30/2023 8:01 PM by Dr. Panfilo Bravo M.D.      CT Cervical Spine Without Contrast    Result Date: 9/30/2023  1. Tiny right frontal subcutaneous hematoma. No underlying calvarial fracture or intracranial hemorrhage. 2. No cervical spine fracture or traumatic subluxation. Degenerative changes as above.     Radiation dose reduction techniques were utilized, including automated exposure control and exposure modulation based on body size.  This report was finalized on 9/30/2023 8:01 PM by Dr. Panfilo Bravo M.D.       Ambulatory status:   - up with assist x2    Social issues:   Social History     Socioeconomic History    Marital status:        NIH Stroke Scale:       Samanta Roger RN  09/30/23 20:37 EDT

## 2023-10-01 NOTE — CONSULTS
Orthopaedic & Hand Surgery  Shoulder pain consult Note  Dr. Noe Siegel  [phone]    CC: Right shoulder pain    HPI:  Patient is a 89 y.o. female with a history of dementia who presents with right shoulder pain. Of note, the patient gives very weak yes and no answers only occasionally. As result, history is obtained solely from the medical record. Attempts to reach her daughter and POA were unsuccessful.    History obtained 10/1/2023:    Pain is characterized as follows:    Onset: 9/30/2023  Location: Right shoulder  Quality: Sore  Severity: Unable to determine based on patient's lack of communication, however, she does frown and retract when the shoulder is palpated  Modifying factors: Better at rest. Worse with any attempts to move  Context: Sustained injury when she got caught in her walker in the memory care unit where she lives, causing her to lose her balance and fall. She presented to the emergency department at [facility] in [place] where imaging revealed an impacted right proximal humerus fracture. She was admitted for orthopedic evaluation and PT.    She is seen alone in the room today. No family is present. Attempts to call the family were unsuccessful, unfortunately.    MEDICAL HISTORY  Past Medical History:   Diagnosis Date    Hepatitis     Hypertension     Stroke      History reviewed. No pertinent surgical history.  Prior to Admission medications    Medication Sig Start Date End Date Taking? Authorizing Provider   hydrALAZINE (APRESOLINE) 50 MG tablet Take 1 tablet by mouth 2 (Two) Times a Day.    Provider, Osei, MD     Allergies   Allergen Reactions    Cephalexin Rash     Extensive morbilliform rash       There is no immunization history on file for this patient.  Social History     Tobacco Use    Smoking status: Former     Types: Cigarettes     Passive exposure: Past    Smokeless tobacco: Never   Substance Use Topics    Alcohol use: Not Currently      Social History  "    Substance and Sexual Activity   Drug Use Never       REVIEW OF SYSTEMS:  Not obtainable due to patient's mental status    VITALS: BP (!) 190/88 (BP Location: Left arm, Patient Position: Lying) Comment: RN notified  Pulse 69   Temp 97.6 °F (36.4 °C) (Oral)   Resp 16   Ht 160 cm (63\")   Wt 62.6 kg (138 lb)   SpO2 96%   BMI 24.45 kg/m²  Body mass index is 24.45 kg/m².    PHYSICAL EXAM:   CONSTITUTIONAL: No acute distress, lying quietly in bed but minimally responsive to questions. She will occasionally follow commands with heavy prompting. Exam is therefore limited  EXTREMITY: Right Upper Extremity  INSPECTION: Skin warm and well-perfused. Ecchymosis and swelling are present at the level of the shoulder. Tender palpation  Palpation: At the glenohumeral joint but not distally. Nontender medially over the clavicle or scapula.  Vascular: 2+ radial pulse and brisk cap refill to the hand  Sensation: Withdraws from light pressure stimulation to the nails in all digits.  Motor: Seen to flex and extend the thumb and fingers independently.  Range of Motion / Stability: Range of motion of the shoulder is minimal, limited by pain.    RADIOLOGY REVIEW:   XR Shoulder 2+ View Right    Result Date: 9/30/2023  Impacted proximal right humeral fracture.  This report was finalized on 9/30/2023 7:45 PM by Dr. Katty Simmons M.D.      XR Humerus Right    Result Date: 9/30/2023  Impacted proximal right humeral fracture.  This report was finalized on 9/30/2023 7:45 PM by Dr. Katty Simmons M.D.      CT Head Without Contrast    Result Date: 9/30/2023  1. Tiny right frontal subcutaneous hematoma. No underlying calvarial fracture or intracranial hemorrhage. 2. No cervical spine fracture or traumatic subluxation. Degenerative changes as above.     Radiation dose reduction techniques were utilized, including automated exposure control and exposure modulation based on body size.  This report was finalized on 9/30/2023 8:01 PM by " Dr. Panfilo Bravo M.D.      CT Cervical Spine Without Contrast    Result Date: 9/30/2023  1. Tiny right frontal subcutaneous hematoma. No underlying calvarial fracture or intracranial hemorrhage. 2. No cervical spine fracture or traumatic subluxation. Degenerative changes as above.     Radiation dose reduction techniques were utilized, including automated exposure control and exposure modulation based on body size.  This report was finalized on 9/30/2023 8:01 PM by Dr. Panfilo Bravo M.D.       I have independently reviewed the images of the shoulder and humerus and agree with radiologist assessment as noted above.    LABS:   Results for the past 24 hours:   Recent Results (from the past 24 hour(s))   Comprehensive Metabolic Panel    Collection Time: 09/30/23  8:33 PM    Specimen: Blood   Result Value Ref Range    Glucose 127 (H) 65 - 99 mg/dL    BUN 21 8 - 23 mg/dL    Creatinine 1.37 (H) 0.57 - 1.00 mg/dL    Sodium 141 136 - 145 mmol/L    Potassium 3.7 3.5 - 5.2 mmol/L    Chloride 105 98 - 107 mmol/L    CO2 25.7 22.0 - 29.0 mmol/L    Calcium 8.8 8.2 - 9.6 mg/dL    Total Protein 6.8 6.0 - 8.5 g/dL    Albumin 3.7 3.5 - 5.2 g/dL    ALT (SGPT) 10 1 - 33 U/L    AST (SGOT) 16 1 - 32 U/L    Alkaline Phosphatase 71 39 - 117 U/L    Total Bilirubin 0.3 0.0 - 1.2 mg/dL    Globulin 3.1 gm/dL    A/G Ratio 1.2 g/dL    BUN/Creatinine Ratio 15.3 7.0 - 25.0    Anion Gap 10.3 5.0 - 15.0 mmol/L    eGFR 35.9 (L) >60.0 mL/min/1.73   CBC Auto Differential    Collection Time: 09/30/23  8:33 PM    Specimen: Blood   Result Value Ref Range    WBC 9.41 3.40 - 10.80 10*3/mm3    RBC 4.52 3.77 - 5.28 10*6/mm3    Hemoglobin 13.7 12.0 - 15.9 g/dL    Hematocrit 40.8 34.0 - 46.6 %    MCV 90.3 79.0 - 97.0 fL    MCH 30.3 26.6 - 33.0 pg    MCHC 33.6 31.5 - 35.7 g/dL    RDW 13.3 12.3 - 15.4 %    RDW-SD 44.1 37.0 - 54.0 fl    MPV 10.4 6.0 - 12.0 fL    Platelets 245 140 - 450 10*3/mm3    Neutrophil % 76.1 (H) 42.7 - 76.0 %    Lymphocyte % 14.5 (L)  19.6 - 45.3 %    Monocyte % 6.9 5.0 - 12.0 %    Eosinophil % 1.6 0.3 - 6.2 %    Basophil % 0.4 0.0 - 1.5 %    Immature Grans % 0.5 0.0 - 0.5 %    Neutrophils, Absolute 7.16 (H) 1.70 - 7.00 10*3/mm3    Lymphocytes, Absolute 1.36 0.70 - 3.10 10*3/mm3    Monocytes, Absolute 0.65 0.10 - 0.90 10*3/mm3    Eosinophils, Absolute 0.15 0.00 - 0.40 10*3/mm3    Basophils, Absolute 0.04 0.00 - 0.20 10*3/mm3    Immature Grans, Absolute 0.05 0.00 - 0.05 10*3/mm3    nRBC 0.0 0.0 - 0.2 /100 WBC   Basic Metabolic Panel    Collection Time: 10/01/23  4:45 AM    Specimen: Blood   Result Value Ref Range    Glucose 123 (H) 65 - 99 mg/dL    BUN 21 8 - 23 mg/dL    Creatinine 1.19 (H) 0.57 - 1.00 mg/dL    Sodium 140 136 - 145 mmol/L    Potassium 3.8 3.5 - 5.2 mmol/L    Chloride 107 98 - 107 mmol/L    CO2 23.0 22.0 - 29.0 mmol/L    Calcium 8.6 8.2 - 9.6 mg/dL    BUN/Creatinine Ratio 17.6 7.0 - 25.0    Anion Gap 10.0 5.0 - 15.0 mmol/L    eGFR 42.5 (L) >60.0 mL/min/1.73   CBC (No Diff)    Collection Time: 10/01/23  4:45 AM    Specimen: Blood   Result Value Ref Range    WBC 8.82 3.40 - 10.80 10*3/mm3    RBC 4.07 3.77 - 5.28 10*6/mm3    Hemoglobin 12.4 12.0 - 15.9 g/dL    Hematocrit 36.9 34.0 - 46.6 %    MCV 90.7 79.0 - 97.0 fL    MCH 30.5 26.6 - 33.0 pg    MCHC 33.6 31.5 - 35.7 g/dL    RDW 13.2 12.3 - 15.4 %    RDW-SD 44.2 37.0 - 54.0 fl    MPV 10.7 6.0 - 12.0 fL    Platelets 238 140 - 450 10*3/mm3       IMPRESSION:  Patient is a 89 y.o. female with right proximal humerus fracture sustained 9/30/2023    Unfortunately, attempts to contact the family were unsuccessful. Plan from orthopedic perspective would be for sling for comfort with gentle pendulum exercises initially, avoiding attempts at active shoulder abduction for the first several weeks. Okay for light use of the hand, particularly with the elbow resting on the surface but avoid any heavy lift. Would not recommend the use of a walker at this time. Defer dispo planning to primary team  with guidance from the therapy team. From a orthopedic perspective, she may be discharged with follow-up with me in 3 weeks. Orthopedics to sign off at this time. Thank you for allowing us to take part in the care of your patient.    PLAN:   Admited to: Jesi Dutton MD  Diet: Per Primary Team  Weight Bearing:  Right Upper Extremity: Light use of the hand with less than a 1 pound lift  Labs: None additional needed  Imaging: None additional needed  Surgery: No surgery indicated for this injury  Agree with consultation with physical therapy for gentle pendulum exercises  Disposition: On observation, per primary. Orthopedics to sign off at this time. Please have her follow-up with us in 3 weeks.    Noe Siegel MD, PhD  Orthopaedic & Hand Surgery  [facility]  [phone] - [place] [facility]  [phone] - [facility]

## 2023-10-01 NOTE — PLAN OF CARE
Goal Outcome Evaluation:  Plan of Care Reviewed With: patient        Progress: no change  Outcome Evaluation: Patient admitted with fracture of right humerus due to fall. Alert and oriented X2, sometimes does not answer questions. IV pain meds given. Patient hypertensive through the night. Providers made aware. Ortho consult placed. Plan pending for today.

## 2023-10-01 NOTE — NURSING NOTE
Spoke with daughter Debra who spoke with the patients facility who told her that unless the patient can ambulate independently with walker she will need SNF.

## 2023-10-02 PROCEDURE — 97166 OT EVAL MOD COMPLEX 45 MIN: CPT

## 2023-10-02 PROCEDURE — 97162 PT EVAL MOD COMPLEX 30 MIN: CPT

## 2023-10-02 PROCEDURE — 97530 THERAPEUTIC ACTIVITIES: CPT

## 2023-10-02 PROCEDURE — 25010000002 HYDRALAZINE PER 20 MG: Performed by: STUDENT IN AN ORGANIZED HEALTH CARE EDUCATION/TRAINING PROGRAM

## 2023-10-02 PROCEDURE — 25010000002 ENOXAPARIN PER 10 MG: Performed by: HOSPITALIST

## 2023-10-02 RX ORDER — HYDRALAZINE HYDROCHLORIDE 20 MG/ML
10 INJECTION INTRAMUSCULAR; INTRAVENOUS ONCE
Status: COMPLETED | OUTPATIENT
Start: 2023-10-02 | End: 2023-10-02

## 2023-10-02 RX ADMIN — LEVOTHYROXINE SODIUM 50 MCG: 50 TABLET ORAL at 09:36

## 2023-10-02 RX ADMIN — HYDRALAZINE HYDROCHLORIDE 25 MG: 25 TABLET ORAL at 18:00

## 2023-10-02 RX ADMIN — DILTIAZEM HYDROCHLORIDE 120 MG: 60 TABLET, FILM COATED ORAL at 09:36

## 2023-10-02 RX ADMIN — ENOXAPARIN SODIUM 30 MG: 100 INJECTION SUBCUTANEOUS at 09:36

## 2023-10-02 RX ADMIN — SENNOSIDES AND DOCUSATE SODIUM 2 TABLET: 50; 8.6 TABLET ORAL at 20:43

## 2023-10-02 RX ADMIN — Medication 3 MG: at 20:43

## 2023-10-02 RX ADMIN — HYDRALAZINE HYDROCHLORIDE 25 MG: 25 TABLET ORAL at 00:10

## 2023-10-02 RX ADMIN — SENNOSIDES AND DOCUSATE SODIUM 2 TABLET: 50; 8.6 TABLET ORAL at 09:36

## 2023-10-02 RX ADMIN — ATORVASTATIN CALCIUM 80 MG: 20 TABLET, FILM COATED ORAL at 20:43

## 2023-10-02 RX ADMIN — LOSARTAN POTASSIUM 25 MG: 25 TABLET, FILM COATED ORAL at 09:35

## 2023-10-02 RX ADMIN — HYDROCODONE BITARTRATE AND ACETAMINOPHEN 1 TABLET: 5; 325 TABLET ORAL at 05:32

## 2023-10-02 RX ADMIN — DOCUSATE SODIUM 100 MG: 100 CAPSULE, LIQUID FILLED ORAL at 09:36

## 2023-10-02 RX ADMIN — HYDRALAZINE HYDROCHLORIDE 25 MG: 25 TABLET ORAL at 05:32

## 2023-10-02 RX ADMIN — HYDROCODONE BITARTRATE AND ACETAMINOPHEN 1 TABLET: 5; 325 TABLET ORAL at 20:45

## 2023-10-02 RX ADMIN — HYDRALAZINE HYDROCHLORIDE 10 MG: 20 INJECTION INTRAMUSCULAR; INTRAVENOUS at 06:35

## 2023-10-02 RX ADMIN — FAMOTIDINE 20 MG: 20 TABLET ORAL at 09:36

## 2023-10-02 RX ADMIN — ASPIRIN 81 MG: 81 TABLET, COATED ORAL at 20:43

## 2023-10-02 NOTE — PLAN OF CARE
Goal Outcome Evaluation:   Patient has a right humerus fracture, being treated with nonsurgical management. Alert only to self (baseline for patient). Sleeping between care. Meds given crushed in applesauce, cooperation varies. RA. Voiding via purewick. BP still running high but has been slowly improving throughout the day. Plan is to discharge to SNF when available. Will continue to monitor.

## 2023-10-02 NOTE — PROGRESS NOTES
"    DAILY PROGRESS NOTE  [facility]    Patient Identification:  Name: Joanna Johnson  Age: 89 y.o.  Sex: female  :  1929  MRN: [id number]         Primary Care Physician: Serina Hannon APRN    Subjective:  Interval History: History and ROS is unreliable given the advancement of her dementia.  You can tell she is hurting because she was wearing the sling but she was holding her right upper extremity with her left hand.  Due to her advanced dementia, her interactions are limited and its difficult to understand at times.    Objective: Elderly and very frail.  No family present.  Patient does seem uncomfortable    Scheduled Meds:aspirin, 81 mg, Oral, Nightly  atorvastatin, 80 mg, Oral, Nightly  dilTIAZem, 120 mg, Oral, Daily  docusate sodium, 100 mg, Oral, BID  enoxaparin, 30 mg, Subcutaneous, Q24H  famotidine, 20 mg, Oral, Daily  hydrALAZINE, 25 mg, Oral, Q6H  levothyroxine, 50 mcg, Oral, Daily  losartan, 25 mg, Oral, Q24H  senna-docusate sodium, 2 tablet, Oral, BID      Continuous Infusions:     Vital signs in last 24 hours:  Temp:  [97.9 °F (36.6 °C)-98.3 °F (36.8 °C)] 98.2 °F (36.8 °C)  Heart Rate:  [50-65] 50  Resp:  [16-17] 16  BP: (151-209)/() 167/90    Intake/Output:    Intake/Output Summary (Last 24 hours) at 10/2/2023 1131  Last data filed at 10/2/2023 0936  Gross per 24 hour   Intake 120 ml   Output 1000 ml   Net -880 ml       Exam:  /90 (BP Location: Left arm, Patient Position: Lying)   Pulse 50   Temp 98.2 °F (36.8 °C) (Oral)   Resp 16   Ht 160 cm (63\")   Wt 62.6 kg (138 lb)   SpO2 96%   BMI 24.45 kg/m²     General Appearance:    Advanced dementia, alert though flat affect                          Head:    Normocephalic, without obvious abnormality, atraumatic                           Eyes:    PERRL, conjunctivae/corneas clear, EOM's intact, both eyes                         Throat:   Oral mucosa pink and moist                           Neck:   Supple, no JVD   "                       Lungs:    Decreased bases otherwise clear to auscultation bilaterally, respirations unlabored                 Chest Wall:    No tenderness or deformity                          Heart:    Regular rate and rhythm, S1 and S2 normal                  Abdomen:     Soft, nontender, bowel sounds active, no masses, no organomegaly                  Extremities: Sling to right upper extremity and patient using her left hand to hold her right extremity while resting in bed due to pain.  NVM intact                  Neurologic:   CNII-XII intact       Data Review:  Labs in chart were reviewed.    Assessment:  Active Hospital Problems    Diagnosis  POA    **Proximal humerus fracture [S42.209A]  Yes    Pain in joint, upper arm, right [M25.521]  Unknown    Dementia with behavioral disturbance [F03.918]  Unknown    HTN (hypertension) [I10]  Unknown      Resolved Hospital Problems   No resolved problems to display.       Plan:    Right proximal humerus fracture with impaction              -Orthopedics has evaluated -no surgical plans noted              -Fall precautions  -P.o. pain control  -Hgb normal at 12.4 -low-dose Lovenox added for prophylaxis        HTN              -Labile due to pain              -Avoid over control and would not endorse as needed IV hydralazine at this time              -Losartan is new to her regimen and hydralazine frequency has been increased with resumption of diltiazem   -Compounded by advanced dementia with current fracture        Pronounced dementia is definitely going to make management of this fracture much more difficult as well as working with physical therapy        CKD 3a -stable and improved creatinine down to 1.19 with normal electrolytes     Add Pepcid for GI prophylaxis         Disposition -will need assistance of CCP for postoperative rehab/long-term care -PT/OT pending   -Likely ready in next 24 to 48 hours          Surgery reached out to daughter unsuccessfully the  other day and was unsuccessful and I reached out as stated below and also unsuccessful     [phone] -listed as daughter/POA.  Called at 1132 and there was no answer.  No voicemail left for HIPAA reasons    Jasson Willson MD  10/2/2023  11:31 EDT

## 2023-10-02 NOTE — PLAN OF CARE
Goal Outcome Evaluation:  Plan of Care Reviewed With: patient           Outcome Evaluation: Pt is a 89 y.o female admitted from memory care facility after fall where she tripped using her rwx resulting in R proximal humerus fx- non operative. Pt with hx of severe dementia. Oriented to self only today which is her reported baseline. Pt's facility reports she is able to return if able to mobilize with a rwx. Orthopedic MD advises pt to not use a rwx at this time and no weight bearing > 1 lb. Pt found in bed, weak appearing. Requires dependent assist X2 to sit EOB and dep/max AX2 to stand. She is not safe for further transfers or mobility at this time and declines participation in ADLs including feeding breakfast. Continued OT recommended to progress pt towards baseline independence. Recommend SNF at [place].      Anticipated Discharge Disposition (OT): skilled nursing facility

## 2023-10-02 NOTE — PLAN OF CARE
"Goal Outcome Evaluation:  Plan of Care Reviewed With: patient           Outcome Evaluation: Pt is a 89 y/o F with h/o dementia who presented to ED with c/o R shoulder pain after a fall at her [facility]. Imaging (+) R proximal humerus fx. Per Ortho: \"sling for comfort with gentle pendulum exercises initially, avoiding attempts at active shoulder abduction for the first several weeks; Light use of the hand with less than a 1 pound lift particularly w/ the elbow resting on the surface, would not rec use of RW\". PLOF unknown as pt unable to provide, however per chart pt is from a memory care unit and was ambulatory w/ a RW. PT is currently DepA x2 for bed mobility and partial STS w/ L HHA. Pt with moans/groans during transitional movements and demos a posterior L lean in sitting requiring Min/ModA to maintain sitting balance. Pt presents below her baseline with decreased strength, impaired balance/postural control and decreased activity tolerance, thus would benefit from skilled PT services to progress these deficits. Rec SNF at [place] to further address deficits listed above.      Anticipated Discharge Disposition (PT): skilled nursing facility  "

## 2023-10-02 NOTE — THERAPY EVALUATION
Patient Name: Joanna Johnson  : 1929    MRN: [id number]                              Today's Date: 10/2/2023       Admit Date: 2023    Visit Dx:     ICD-10-CM ICD-9-CM   1. Other closed displaced fracture of proximal end of right humerus, initial encounter  S42.291A 812.09   2. Fall, initial encounter  W19.XXXA E888.9   3. Closed head injury, initial encounter  S09.90XA 959.01   4. Uncontrolled hypertension  I10 401.9     Patient Active Problem List   Diagnosis    Proximal humerus fracture    Pain in joint, upper arm, right    Dementia with behavioral disturbance    HTN (hypertension)     Past Medical History:   Diagnosis Date    Hepatitis     Hypertension     Stroke      History reviewed. No pertinent surgical history.   General Information       Row Name 10/02/23 1303          OT Time and Intention    Document Type evaluation  -     Mode of Treatment occupational therapy;co-treatment  co treat with PT 2/2 pt poor tolerance for therapy to maximize benefits of therapy with 2 disciplines  -       Row Name 10/02/23 1303          General Information    Patient Profile Reviewed yes  -SM     Prior Level of Function --  pt from [facility] care facility, limited information present at this time for ADL independence. Pt does mobilize with a rwx at baseline.  -     Existing Precautions/Restrictions fall;weight bearing  per ortho MD, OK for shoulder pendulums, no shoulder abd for a few weeks, OK for light use of hand but unable to lift more than 1 lb.  -     Barriers to Rehab medically complex;cognitive status  -       Row Name 10/02/23 1303          Living Environment    People in Home facility resident  -       Row Name 10/02/23 1303          Cognition    Orientation Status (Cognition) oriented to;person  -       Row Name 10/02/23 1303          Safety Issues, Functional Mobility    Impairments Affecting Function (Mobility) balance;cognition;endurance/activity tolerance;strength;pain;range of  "motion (ROM);postural/trunk control;coordination  -               User Key  (r) = Recorded By, (t) = Taken By, (c) = Cosigned By      Initials Name Provider Type    Ashely Vaughan OT Occupational Therapist                     Mobility/ADL's       Row Name 10/02/23 1306          Bed Mobility    Bed Mobility supine-sit;sit-supine  -     Supine-Sit [place] (Bed Mobility) dependent (less than 25% patient effort);2 person assist;verbal cues  -     Sit-Supine [place] (Bed Mobility) dependent (less than 25% patient effort);2 person assist;verbal cues  -     Assistive Device (Bed Mobility) draw sheet;head of bed elevated  -       Row Name 10/02/23 1306          Transfers    Transfers sit-stand transfer  -     Comment, (Transfers) HHA, pt able to stand < 10 seconds for sheets to be changed  -[facility] Name 10/02/23 1306          Sit-Stand Transfer    Sit-Stand [place] (Transfers) maximum assist (25% patient effort);2 person assist;dependent (less than 25% patient effort)  -       Row Name 10/02/23 1306          Activities of Daily Living    BADL Assessment/Intervention lower body dressing;toileting;feeding  -[facility] Name 10/02/23 1306          Lower Body Dressing Assessment/Training    [place] Level (Lower Body Dressing) don;socks;dependent (less than 25% patient effort)  -     Position (Lower Body Dressing) supine  -[facility] Name 10/02/23 1306          Toileting Assessment/Training    [place] Level (Toileting) dependent (less than 25% patient effort)  -     Position (Toileting) supine  -[facility] Name 10/02/23 [facility]6          Self-Feeding Assessment/Training    Position (Self-Feeding) sitting up in bed;edge of bed sitting  -     Comment, (Feeding) pt has not touched breakfast tray, OT atttempted to offer food and drink from tray but pt just states \"no\"  -               User Key  (r) = Recorded By, (t) = Taken By, (c) = Cosigned By      Initials Name Provider " Type     Ashely Shafer OT Occupational Therapist                   Obj/Interventions       Row Name 10/02/23 1307          Sensory Assessment (Somatosensory)    Sensory Assessment (Somatosensory) unable/difficult to assess  -[facility] Name 10/02/23 1307          Range of Motion Comprehensive    Comment, General Range of Motion Pt able to minimally perform AROM of R hand digits. Unable to tolerate AROM/PROM at this time of R elbow or shoulder. L shoulder minimal AROM observed today. Pt resistive of OT assisting with L shoulder ROM or does not actively reach to command.  -[facility] Name 10/02/23 1307          Strength Comprehensive (MMT)    Comment, General Manual Muscle Testing (MMT) Assessment generally very weak today all extremities. Cogntive and acute R humerus fracture limiting any formal MMT.  -[facility] Name 10/02/23 1307          Motor Skills    Motor Skills functional endurance  -     Functional Endurance poor  -SM       Row Name 10/02/23 1307          Balance    Balance Assessment sitting static balance;standing static balance  -     Static Sitting Balance minimal assist  -     Static Standing Balance maximum assist;2-person assist  -               User Key  (r) = Recorded By, (t) = Taken By, (c) = Cosigned By      Initials Name Provider Type     Ashely Shafer OT Occupational Therapist                   Goals/Plan       Row Name 10/02/23 1315          Bed Mobility Goal 1 (OT)    Activity/Assistive Device (Bed Mobility Goal 1, OT) sit to supine/supine to sit  -     [place] Level/Cues Needed (Bed Mobility Goal 1, OT) moderate assist (50-74% patient effort)  -     Time Frame (Bed Mobility Goal 1, OT) short term goal (STG);2 weeks  -     Progress/Outcomes (Bed Mobility Goal 1, OT) goal ongoing  -SM       Row Name 10/02/23 1315          Transfer Goal 1 (OT)    Activity/Assistive Device (Transfer Goal 1, OT) bed-to-chair/chair-to-bed;commode, bedside without drop arms   "-SM     [place] Level/Cues Needed (Transfer Goal 1, OT) moderate assist (50-74% patient effort)  -SM     Time Frame (Transfer Goal 1, OT) short term goal (STG);2 weeks  -SM     Progress/Outcome (Transfer Goal 1, OT) goal ongoing  -       Row Name 10/02/23 1315          Grooming Goal 1 (OT)    Activity/Device (Grooming Goal 1, OT) grooming skills, all  -SM     [place] (Grooming Goal 1, OT) minimum assist (75% or more patient effort)  -SM     Time Frame (Grooming Goal 1, OT) short term goal (STG);2 weeks  -SM     Progress/Outcome (Grooming Goal 1, OT) goal ongoing  -       Row Name 10/02/23 1315          Self-Feeding Goal 1 (OT)    Activity/Device (Self-Feeding Goal 1, OT) self-feeding skills, all  -SM     [place] Level/Cues Needed (Self-Feeding Goal 1, OT) supervision required;set-up required  -SM     Time Frame (Self-Feeding Goal 1, OT) short term goal (STG);2 weeks  -SM     Progress/Outcomes (Self-Feeding Goal 1, OT) goal ongoing  -       Row Name 10/02/23 1315          Therapy Assessment/Plan (OT)    Planned Therapy Interventions (OT) activity tolerance training;functional balance retraining;occupation/activity based interventions;patient/caregiver education/training;transfer/mobility retraining  -               User Key  (r) = Recorded By, (t) = Taken By, (c) = Cosigned By      Initials Name Provider Type     Ashely Shafer, OT Occupational Therapist                   Clinical Impression       Row Name 10/02/23 1306          Pain Assessment    Pretreatment Pain Rating 10/10  -SM     Posttreatment Pain Rating 10/10  -SM     Pain Location - Side/Orientation Right  -SM     Pain Location upper  -SM     Pain Location - extremity  -SM     Pre/Posttreatment Pain Comment \"it hurts\"  -     Pain Intervention(s) Repositioned;Elevated;Rest  sling adjusted  -       Row Name 10/02/23 1790          Plan of Care Review    Plan of Care Reviewed With patient  -SM     Outcome Evaluation Pt is a 89 " y.o female admitted from memory care facility after fall where she tripped using her rwx resulting in R proximal humerus fx- non operative. Pt with hx of severe dementia. Oriented to self only today which is her reported baseline. Pt's facility reports she is able to return if able to mobilize with a rwx. Orthopedic MD advises pt to not use a rwx at this time and no weight bearing > 1 lb. Pt found in bed, weak appearing. Requires dependent assist X2 to sit EOB and dep/max AX2 to stand. She is not safe for further transfers or mobility at this time and declines participation in ADLs including feeding breakfast. Continued OT recommended to progress pt towards baseline independence. Recommend SNF at [place].  -       Row Name 10/02/23 3159          Therapy Assessment/Plan (OT)    Rehab Potential (OT) fair, will monitor progress closely  -     Criteria for Skilled Therapeutic Interventions Met (OT) yes;skilled treatment is necessary  -     Therapy Frequency (OT) 3 times/wk  -       Row Name 10/02/23 8897          Therapy Plan Review/Discharge Plan (OT)    Anticipated Discharge Disposition (OT) skilled nursing facility  -       Row Name 10/02/23 1302          Positioning and Restraints    Pre-Treatment Position in bed  -     Post Treatment Position bed  -SM     In Bed fowlers;call light within reach;encouraged to call for assist;exit alarm on;notified [facility]  -               User Key  (r) = Recorded By, (t) = Taken By, (c) = Cosigned By      Initials Name Provider Type    Ashely Vaughan, OT Occupational Therapist                   Outcome Measures       Row Name 10/02/23 4267          How much help from another is currently needed...    Putting on and taking off regular lower body clothing? 1  -SM     Bathing (including washing, rinsing, and drying) 1  -SM     Toileting (which includes using toilet bed pan or urinal) 1  -SM     Putting on and taking off regular upper body clothing 1  -SM     Taking care of  personal grooming (such as brushing teeth) 1  -SM     Eating meals 1  -SM     AM-PAC 6 Clicks Score (OT) 6  -SM       Row Name 10/02/23 1320 10/02/23 0936       How much help from another person do you currently need...    Turning from your back to your side while in flat bed without using bedrails? 3  -MG 3  -ARIANNE    Moving from lying on back to sitting on the side of a flat bed without bedrails? 1  -MG 2  -ARIANNE    Moving to and from a bed to a chair (including a wheelchair)? 1  -MG 1  -ARIANNE    Standing up from a chair using your arms (e.g., wheelchair, bedside chair)? 2  -MG 1  -ARIANNE    Climbing 3-5 steps with a railing? 1  -MG 1  -ARIANNE    To walk in hospital room? 1  -MG 1  -ARIANNE    AM-PAC 6 Clicks Score (PT) 9  -MG 9  -ARIANNE    Highest level of mobility 3 --> Sat at edge of bed  -MG 3 --> Sat at edge of bed  -ARIANNE      Row Name 10/02/23 1319          Functional Assessment    Outcome Measure Options AM-PAC 6 Clicks Daily Activity (OT)  -               User Key  (r) = Recorded By, (t) = Taken By, (c) = Cosigned By      Initials Name Provider Type    Allison Biswas, PT Physical Therapist    Ashely Vaughan, OT Occupational Therapist    Kevin Perera, RN Registered Nurse                    Occupational Therapy Education       Title: PT OT SLP Therapies (In Progress)       Topic: Occupational Therapy (In Progress)       Point: ADL training (Not Started)       Description:   Instruct learner(s) on proper safety adaptation and remediation techniques during self care or transfers.   Instruct in proper use of assistive devices.                  Learner Progress:  Not documented in this visit.              Point: Home exercise program (Not Started)       Description:   Instruct learner(s) on appropriate technique for monitoring, assisting and/or progressing therapeutic exercises/activities.                  Learner Progress:  Not documented in this visit.              Point: Precautions (In Progress)       Description:    Instruct learner(s) on prescribed precautions during self-care and functional transfers.                  Learning Progress Summary             Patient Acceptance, E, NR by  at 10/2/2023 1320    Comment: precautions for RUE                         Point: Body mechanics (Not Started)       Description:   Instruct learner(s) on proper positioning and spine alignment during self-care, functional mobility activities and/or exercises.                  Learner Progress:  Not documented in this visit.                              User Key       Initials Effective Dates Name Provider Type Discipline     04/02/20 -  Ashely Shafer, OT Occupational Therapist OT                  OT Recommendation and Plan  Planned Therapy Interventions (OT): activity tolerance training, functional balance retraining, occupation/activity based interventions, patient/caregiver education/training, transfer/mobility retraining  Therapy Frequency (OT): 3 times/wk  Plan of Care Review  Plan of Care Reviewed With: patient  Outcome Evaluation: Pt is a 89 y.o female admitted from memory care facility after fall where she tripped using her rwx resulting in R proximal humerus fx- non operative. Pt with hx of severe dementia. Oriented to self only today which is her reported baseline. Pt's facility reports she is able to return if able to mobilize with a rwx. Orthopedic MD advises pt to not use a rwx at this time and no weight bearing > 1 lb. Pt found in bed, weak appearing. Requires dependent assist X2 to sit EOB and dep/max AX2 to stand. She is not safe for further transfers or mobility at this time and declines participation in ADLs including feeding breakfast. Continued OT recommended to progress pt towards baseline independence. Recommend SNF at [place].     Time Calculation:   Evaluation Complexity (OT)  Review Occupational Profile/Medical/Therapy History Complexity: expanded/moderate complexity  Assessment, Occupational  Performance/Identification of Deficit Complexity: 3-5 performance deficits  Clinical Decision Making Complexity (OT): detailed assessment/moderate complexity  Overall Complexity of Evaluation (OT): moderate complexity     Time Calculation- OT       Row Name 10/02/23 1320             Time Calculation- OT    OT Start Time 1104  -SM      OT Stop Time 1120  -SM      OT Time Calculation (min) 16 min  -SM      Total Timed Code Minutes- OT 8 minute(s)  -SM      OT Received On 10/02/23  -      OT - Next Appointment 10/04/23  -SM      OT Goal Re-Cert Due Date 10/16/23  -SM         Timed Charges    86026 - OT Therapeutic Activity Minutes 8  -SM         Untimed Charges    OT Eval/Re-eval Minutes 8  -SM         Total Minutes    Timed Charges Total Minutes 8  -SM      Untimed Charges Total Minutes 8  -SM       Total Minutes 16  -SM                User Key  (r) = Recorded By, (t) = Taken By, (c) = Cosigned By      Initials Name Provider Type     Ashely Shafer, OT Occupational Therapist                  Therapy Charges for Today       Code Description Service Date Service Provider Modifiers Qty    [id number]  OT THERAPEUTIC ACT EA 15 MIN 10/2/2023 Ashely Shafer OT GO 1    [id number] HC OT EVAL MOD COMPLEXITY 2 10/2/2023 Ashely Shafer OT GO 1                 Ashely Shafer OT  10/2/2023

## 2023-10-02 NOTE — THERAPY EVALUATION
Patient Name: Joanna Johnson  : 1929    MRN: [id number]                              Today's Date: 10/2/2023       Admit Date: 2023    Visit Dx:     ICD-10-CM ICD-9-CM   1. Other closed displaced fracture of proximal end of right humerus, initial encounter  S42.291A 812.09   2. Fall, initial encounter  W19.XXXA E888.9   3. Closed head injury, initial encounter  S09.90XA 959.01   4. Uncontrolled hypertension  I10 401.9     Patient Active Problem List   Diagnosis    Proximal humerus fracture    Pain in joint, upper arm, right    Dementia with behavioral disturbance    HTN (hypertension)     Past Medical History:   Diagnosis Date    Hepatitis     Hypertension     Stroke      History reviewed. No pertinent surgical history.   General Information       Row Name 10/02/23 1305          Physical Therapy Time and Intention    Document Type evaluation  -MG     Mode of Treatment co-treatment;physical therapy;occupational therapy  -MG       Row Name 10/02/23 1309          General Information    Patient Profile Reviewed yes  -MG     Prior Level of Function --  Unknown PLOF. Pt from memory care unit. Per chart pt was ambulatory w/ RW.  -MG     Existing Precautions/Restrictions fall;other (see comments)  RUE NWB in sling; light use of hand w/ elbow resting on a surface.  -MG     Barriers to Rehab medically complex;previous functional deficit;cognitive status  -MG       Row Name 10/02/23 1305          Living Environment    People in Home facility resident  -MG       Row Name 10/02/23 1305          Home Main Entrance    Number of Stairs, Main Entrance none  -MG       Row Name 10/02/23 1305          Stairs Within Home, Primary    Number of Stairs, Within Home, Primary none  -MG       Row Name 10/02/23 1305          Cognition    Orientation Status (Cognition) oriented to;person  -MG       Row Name 10/02/23 1305          Safety Issues, Functional Mobility    Impairments Affecting Function (Mobility)  balance;cognition;endurance/activity tolerance;strength;pain;range of motion (ROM);postural/trunk control;coordination  -MG     Comment, Safety Issues/Impairments (Mobility) Gait belt, sling and non-skid socks donned. PT/OT cotx due to anticipation of poor activity tolerance, impaired cognition, impaired pain tolerance and need for two skilled hands for safe functional mobility.  -MG               User Key  (r) = Recorded By, (t) = Taken By, (c) = Cosigned By      Initials Name Provider Type    MG Allison Castañeda, PT Physical Therapist                   Mobility       Row Name 10/02/23 1307          Bed Mobility    Bed Mobility scooting/bridging;supine-sit;sit-supine  -MG     Scooting/Bridging Cole (Bed Mobility) dependent (less than 25% patient effort);2 person assist;verbal cues  -MG     Supine-Sit [place] (Bed Mobility) dependent (less than 25% patient effort);2 person assist;verbal cues  -MG     Sit-Supine [place] (Bed Mobility) dependent (less than 25% patient effort);2 person assist;verbal cues  -MG     Assistive Device (Bed Mobility) draw sheet  -MG     Comment, (Bed Mobility) Moans/groans with movement. Pt holding RUE. Slow/gentle movement with use of draw sheet. Pt not able to follow cues/commands to assist/help. Sling was adjusted to fit correctly while in sitting.  -MG       Row Name 10/02/23 1307          Sit-Stand Transfer    Sit-Stand Cole (Transfers) maximum assist (25% patient effort);dependent (less than 25% patient effort);2 person assist;verbal cues;nonverbal cues (demo/gesture)  -MG     Assistive Device (Sit-Stand Transfers) other (see comments)  HHA  -MG     Comment, (Sit-Stand Transfer) B feet blocked. L HHA. OT on R holding onto gait belt. Unable to come to full standing. Moans/groans w/ tsf. Stood <10s.  -MG               User Key  (r) = Recorded By, (t) = Taken By, (c) = Cosigned By      Initials Name Provider Type    Allison Biswas, PT Physical Therapist          "          Obj/Interventions       Row Name 10/02/23 1310          Range of Motion Comprehensive    General Range of Motion lower extremity range of motion deficits identified  -MG     Comment, General Range of Motion Pt able to perform minimal, active range of B ankle DF/PF and R knee flex/ext. When asked to kick LLE, pt stating \"No\" and would not perform any further LE mobility.  -MG       Row Name 10/02/23 1310          Strength Comprehensive (MMT)    General Manual Muscle Testing (MMT) Assessment lower extremity strength deficits identified  -MG     Comment, General Manual Muscle Testing (MMT) Assessment Generalized weakness. Grossly 3/5 to 3+/5. Cognition and pain limiting any MMT or functional assessment.  -MG       Row Name 10/02/23 1310          Motor Skills    Motor Skills functional endurance  -MG       Row Name 10/02/23 1310          Balance    Balance Assessment sitting static balance;standing static balance  -MG     Static Sitting Balance minimal assist;moderate assist  -MG     Position, Sitting Balance sitting edge of bed  -MG     Static Standing Balance maximum assist;2-person assist;verbal cues  -MG     Position/Device Used, Standing Balance supported  L HHA and gait belt.  -MG     Comment, Balance Posterior L lean in sitting. Pt unable to sit without assistance despite cueing.  -MG       Row Name 10/02/23 1310          Sensory Assessment (Somatosensory)    Sensory Assessment (Somatosensory) unable/difficult to assess  -MG               User Key  (r) = Recorded By, (t) = Taken By, (c) = Cosigned By      Initials Name Provider Type    MG Allison Castañeda, PT Physical Therapist                   Goals/Plan       Row Name 10/02/23 131          Bed Mobility Goal 1 (PT)    Activity/Assistive Device (Bed Mobility Goal 1, PT) bed mobility activities, all  -MG     [place] Level/Cues Needed (Bed Mobility Goal 1, PT) moderate assist (50-74% patient effort)  -MG     Time Frame (Bed Mobility Goal 1, PT) 1 " "week  -MG       Row Name 10/02/23 1319          Transfer Goal 1 (PT)    Activity/Assistive Device (Transfer Goal 1, PT) transfers, all  -MG     [place] Level/Cues Needed (Transfer Goal 1, PT) moderate assist (50-74% patient effort);maximum assist (25-49% patient effort)  -MG     Time Frame (Transfer Goal 1, PT) 1 week  -MG       Row Name 10/02/23 1319          Therapy Assessment/Plan (PT)    Planned Therapy Interventions (PT) balance training;bed mobility training;gait training;home exercise program;ROM (range of motion);neuromuscular re-education;strengthening;stretching;transfer training;patient/family education;postural re-education  -MG               User Key  (r) = Recorded By, (t) = Taken By, (c) = Cosigned By      Initials Name Provider Type    MG Allison Castañeda, PT Physical Therapist                   Clinical Impression       Row Name 10/02/23 1310          Pain    Pretreatment Pain Rating 10/10  -MG     Posttreatment Pain Rating 10/10  -MG     Pain Location - Side/Orientation Right  -MG     Pain Location upper  -MG     Pain Location - shoulder;extremity  -MG     Pre/Posttreatment Pain Comment Slowly and with low volume states \"Ten\".  -MG     Pain Intervention(s) Medication (See MAR);Repositioned;Rest  -MG       Row Name 10/02/23 6649          Plan of Care Review    Plan of Care Reviewed With patient  -MG     Outcome Evaluation Pt is a 89 y/o F with h/o dementia who presented to ED with c/o R shoulder pain after a fall at her [facility]. Imaging (+) R proximal humerus fx. Per Ortho: \"sling for comfort with gentle pendulum exercises initially, avoiding attempts at active shoulder abduction for the first several weeks; Light use of the hand with less than a 1 pound lift particularly w/ the elbow resting on the surface, would not rec use of RW\". PLOF unknown as pt unable to provide, however per chart pt is from a memory care unit and was ambulatory w/ a RW. PT is currently DepA x2 for bed mobility and partial " STS w/ L HHA. Pt with moans/groans during transitional movements and demos a posterior L lean in sitting requiring Min/ModA to maintain sitting balance. Pt presents below her baseline with decreased strength, impaired balance/postural control and decreased activity tolerance, thus would benefit from skilled PT services to progress these deficits. Rec SNF at [place] to further address deficits listed above.  -MG       Row Name 10/02/23 1313          Therapy Assessment/Plan (PT)    Rehab Potential (PT) fair, will monitor progress closely  -MG     Criteria for Skilled Interventions Met (PT) yes;meets criteria  -MG     Therapy Frequency (PT) 3 times/wk  -MG               User Key  (r) = Recorded By, (t) = Taken By, (c) = Cosigned By      Initials Name Provider Type    Allison Biswas, PT Physical Therapist                   Outcome Measures       Row Name 10/02/23 1320 10/02/23 0936       How much help from another person do you currently need...    Turning from your back to your side while in flat bed without using bedrails? 3  -MG 3  -ARIANNE    Moving from lying on back to sitting on the side of a flat bed without bedrails? 1  -MG 2  -ARIANNE    Moving to and from a bed to a chair (including a wheelchair)? 1  -MG 1  -ARIANNE    Standing up from a chair using your arms (e.g., wheelchair, bedside chair)? 2  -MG 1  -ARIANNE    Climbing 3-5 steps with a railing? 1  -MG 1  -ARIANNE    To walk in hospital room? 1  -MG 1  -ARIANNE    AM-PAC 6 Clicks Score (PT) 9  -MG 9  -ARIANNE    Highest level of mobility 3 --> Sat at edge of bed  -MG 3 --> Sat at edge of bed  -ARIANNE      Row Name 10/02/23 1319          Functional Assessment    Outcome Measure Options AM-PAC 6 Clicks Daily Activity (OT)  -SM               User Key  (r) = Recorded By, (t) = Taken By, (c) = Cosigned By      Initials Name Provider Type    Allison Biswas, PT Physical Therapist    Ashely Vaughan, OT Occupational Therapist    Kevin Perera, RN Registered Nurse                             "     Physical Therapy Education       Title: PT OT SLP Therapies (In Progress)       Topic: Physical Therapy (In Progress)       Point: Precautions (In Progress)       Learning Progress Summary             Patient Acceptance, E, NR by  at 10/2/2023 1320                                         User Key       Initials Effective Dates Name Provider Type Discipline     05/24/22 -  Allison Castañeda PT Physical Therapist PT                  PT Recommendation and Plan  Planned Therapy Interventions (PT): balance training, bed mobility training, gait training, home exercise program, ROM (range of motion), neuromuscular re-education, strengthening, stretching, transfer training, patient/family education, postural re-education  Plan of Care Reviewed With: patient  Outcome Evaluation: Pt is a 89 y/o F with h/o dementia who presented to ED with c/o R shoulder pain after a fall at her [facility]. Imaging (+) R proximal humerus fx. Per Ortho: \"sling for comfort with gentle pendulum exercises initially, avoiding attempts at active shoulder abduction for the first several weeks; Light use of the hand with less than a 1 pound lift particularly w/ the elbow resting on the surface, would not rec use of RW\". PLOF unknown as pt unable to provide, however per chart pt is from a memory care unit and was ambulatory w/ a RW. PT is currently DepA x2 for bed mobility and partial STS w/ L HHA. Pt with moans/groans during transitional movements and demos a posterior L lean in sitting requiring Min/ModA to maintain sitting balance. Pt presents below her baseline with decreased strength, impaired balance/postural control and decreased activity tolerance, thus would benefit from skilled PT services to progress these deficits. Rec SNF at [place] to further address deficits listed above.     Time Calculation:         PT Charges       Row Name 10/02/23 1320             Time Calculation    Start Time 1104  -MG      Stop Time 1120  -MG      Time Calculation " (min) 16 min  -MG      PT Received On 10/02/23  -MG      PT - Next Appointment 10/04/23  -MG      PT Goal Re-Cert Due Date 10/09/23  -MG         Time Calculation- PT    Total Timed Code Minutes- PT 10 minute(s)  -MG                User Key  (r) = Recorded By, (t) = Taken By, (c) = Cosigned By      Initials Name Provider Type    MG Allison Castañeda, PT Physical Therapist                  Therapy Charges for Today       Code Description Service Date Service Provider Modifiers Qty    12792499502 HC PT EVAL MOD COMPLEXITY 3 10/2/2023 Allison Castañeda, PT GP 1    30953104444 HC PT THERAPEUTIC ACT EA 15 MIN 10/2/2023 Allison Castañeda, PT GP 1            PT G-Codes  Outcome Measure Options: AM-PAC 6 Clicks Daily Activity (OT)  AM-PAC 6 Clicks Score (PT): 9  AM-PAC 6 Clicks Score (OT): 6  PT Discharge Summary  Anticipated Discharge Disposition (PT): skilled nursing facility    Allison Castañeda PT  10/2/2023

## 2023-10-03 PROCEDURE — 25010000002 ONDANSETRON PER 1 MG: Performed by: INTERNAL MEDICINE

## 2023-10-03 PROCEDURE — 97530 THERAPEUTIC ACTIVITIES: CPT

## 2023-10-03 PROCEDURE — 25010000002 ENOXAPARIN PER 10 MG: Performed by: HOSPITALIST

## 2023-10-03 RX ADMIN — LOSARTAN POTASSIUM 25 MG: 25 TABLET, FILM COATED ORAL at 08:32

## 2023-10-03 RX ADMIN — DILTIAZEM HYDROCHLORIDE 120 MG: 60 TABLET, FILM COATED ORAL at 08:31

## 2023-10-03 RX ADMIN — HYDRALAZINE HYDROCHLORIDE 25 MG: 25 TABLET ORAL at 12:50

## 2023-10-03 RX ADMIN — ENOXAPARIN SODIUM 30 MG: 100 INJECTION SUBCUTANEOUS at 08:30

## 2023-10-03 RX ADMIN — BISACODYL 5 MG: 5 TABLET, COATED ORAL at 20:41

## 2023-10-03 RX ADMIN — SENNOSIDES AND DOCUSATE SODIUM 2 TABLET: 50; 8.6 TABLET ORAL at 20:41

## 2023-10-03 RX ADMIN — ASPIRIN 81 MG: 81 TABLET, COATED ORAL at 20:41

## 2023-10-03 RX ADMIN — HYDROCODONE BITARTRATE AND ACETAMINOPHEN 1 TABLET: 5; 325 TABLET ORAL at 20:43

## 2023-10-03 RX ADMIN — HYDRALAZINE HYDROCHLORIDE 25 MG: 25 TABLET ORAL at 00:06

## 2023-10-03 RX ADMIN — SENNOSIDES AND DOCUSATE SODIUM 2 TABLET: 50; 8.6 TABLET ORAL at 08:32

## 2023-10-03 RX ADMIN — LEVOTHYROXINE SODIUM 50 MCG: 50 TABLET ORAL at 08:33

## 2023-10-03 RX ADMIN — FAMOTIDINE 20 MG: 20 TABLET ORAL at 08:31

## 2023-10-03 RX ADMIN — HYDRALAZINE HYDROCHLORIDE 25 MG: 25 TABLET ORAL at 17:39

## 2023-10-03 RX ADMIN — ATORVASTATIN CALCIUM 80 MG: 20 TABLET, FILM COATED ORAL at 20:41

## 2023-10-03 RX ADMIN — HYDRALAZINE HYDROCHLORIDE 25 MG: 25 TABLET ORAL at 05:26

## 2023-10-03 RX ADMIN — DOCUSATE SODIUM 100 MG: 100 CAPSULE, LIQUID FILLED ORAL at 08:31

## 2023-10-03 RX ADMIN — HYDROCODONE BITARTRATE AND ACETAMINOPHEN 1 TABLET: 5; 325 TABLET ORAL at 05:26

## 2023-10-03 RX ADMIN — ONDANSETRON 4 MG: 2 INJECTION INTRAMUSCULAR; INTRAVENOUS at 14:05

## 2023-10-03 NOTE — PLAN OF CARE
Goal Outcome Evaluation:  Plan of Care Reviewed With: patient        Progress: improving  Outcome Evaluation: Pt received in bed upon arrival and agreeable to PT. Pt with noted confusion but able to answer questions with slow processing. Pt required max A x 2 for bed mobility. Cues provided for sequencing with increased time to complete. Sling donned on R UE once sitting EOB. Pt stood from EOB 3x requiring mod/max A x 2 c L UE HHA. Pt demo's a flexed posture and fatigues quickly only able to stand roughly 10-15 seconds on each attempt. Pt unable to weight-shift to advance B LE so further mobility deferred. Pt returned to bed with R UE elevated. Pt c/o nausea with activity - RN notified. Pt will continue to benefit from skilled PT to address strength, endurance, and functional mobility.      Anticipated Discharge Disposition (PT): skilled nursing facility

## 2023-10-03 NOTE — CASE MANAGEMENT/SOCIAL WORK
Discharge Planning Assessment  [facility]     Patient Name: Joanna Johnson  MRN: [id number]  Today's Date: 10/3/2023    Admit Date: 9/30/2023    Plan: SNF- [facility] ([facility] and [facility] ( [facility])   Discharge Needs Assessment       Row Name 10/03/23 1338       Living Environment    People in Home facility resident    Current Living Arrangements assisted living facility    Potentially Unsafe Housing Conditions none    Primary Care Provided by self    Provides Primary Care For no one    Family Caregiver if Needed none    Quality of Family Relationships helpful;involved;supportive    Able to Return to Prior Arrangements yes       Resource/Environmental Concerns    Resource/Environmental Concerns none    Transportation Concerns no car       Transition Planning    Patient/Family Anticipates Transition to long-term care facility    Patient/Family Anticipated Services at Transition     Transportation Anticipated health plan transportation       Discharge Needs Assessment    Readmission Within the Last 30 Days no previous admission in last 30 days    Equipment Currently Used at Home walker, rolling    Concerns to be Addressed denies needs/concerns at this time    Anticipated Changes Related to Illness none                   Discharge Plan       Row Name 10/03/23 3448       Plan    Plan SNF- referrals pending to [facility] ([facility] and [facility] ( [facility])    Patient/Family in Agreement with Plan yes    Plan Comments Spoke with daughter, Jaclyn ( [phone]), but phone. Introduced self and explained role. Facesheet verified. Patient lives at [facility] and uses a walker at baseline. Patient will need SNF at [place]. Daughter would like referrals placed to [facility] ( [facility] choice) and [facility] ( 2nd choice). Transfer packet started and in CCP office. CCP will follow.                  Continued Care and Services - Admitted Since  9/30/2023    Coordination has not been started for this encounter.       Expected Discharge Date and Time       Expected Discharge Date Expected Discharge Time    Oct 4, 2023            Demographic Summary       Row Name 10/03/23 1337       General Information    Admission Type inpatient    Arrived From emergency department    Required Notices Provided Important Message from Medicare    Referral Source admission list    Reason for Consult discharge planning                   Functional Status       Row Name 10/03/23 1337       Functional Status    Usual Activity Tolerance fair    Current Activity Tolerance poor       Functional Status, IADL    Medications completely dependent    Meal Preparation completely dependent    Housekeeping completely dependent    Laundry completely dependent    Shopping completely dependent       Mental Status    General Appearance WDL WDL                   Psychosocial    No documentation.                  Abuse/Neglect    No documentation.                  Legal    No documentation.                  Substance Abuse    No documentation.                  Patient Forms    No documentation.                     Margo Friend RN

## 2023-10-03 NOTE — PLAN OF CARE
Goal Outcome Evaluation:           Progress: improving  Outcome Evaluation: Pt admitted 9-30 for fall at memory care facility resulting in R humerus fx, sling in place, non operable. Pt A&O to self, place, and time, very soft spoken. Pt VSS, on room air, voiding per purewick, assist x2 when ambulating due to not being able to follow commands, pain controlled with prn oral pain meds, takes meds crushed in pudding. Pt plan pending rehab placement due to not being able to return to memory care facility due to having to use a walker and not being able to use walker. Pt plan pending rehab facility

## 2023-10-03 NOTE — THERAPY TREATMENT NOTE
Patient Name: Joanna Johnson  : 1929    MRN: [id number]                              Today's Date: 10/3/2023       Admit Date: 2023    Visit Dx:     ICD-10-CM ICD-9-CM   1. Other closed displaced fracture of proximal end of right humerus, initial encounter  S42.291A 812.09   2. Fall, initial encounter  W19.XXXA E888.9   3. Closed head injury, initial encounter  S09.90XA 959.01   4. Uncontrolled hypertension  I10 401.9     Patient Active Problem List   Diagnosis    Proximal humerus fracture    Pain in joint, upper arm, right    Dementia with behavioral disturbance    HTN (hypertension)     Past Medical History:   Diagnosis Date    Hepatitis     Hypertension     Stroke      History reviewed. No pertinent surgical history.   General Information       Row Name 10/03/23 1358          Physical Therapy Time and Intention    Document Type therapy note (daily note)  -CS     Mode of Treatment individual therapy;physical therapy  -CS       Row Name 10/03/23 1358          General Information    Patient Profile Reviewed yes  -CS     Existing Precautions/Restrictions fall;other (see comments)  RUE NWB in sling; light use of hand w/ elbow resting on a surface  -CS       Row Name 10/03/23 1358          Cognition    Orientation Status (Cognition) oriented to;person  -CS       Row Name 10/03/23 1358          Safety Issues, Functional Mobility    Safety Issues Affecting Function (Mobility) insight into deficits/self-awareness;judgment;problem-solving  -CS     Impairments Affecting Function (Mobility) balance;cognition;endurance/activity tolerance;strength;pain;range of motion (ROM);postural/trunk control;coordination  -CS               User Key  (r) = Recorded By, (t) = Taken By, (c) = Cosigned By      Initials Name Provider Type    CS Terrie Lopez, PETTY Physical Therapist                   Mobility       Row Name 10/03/23 1350          Bed Mobility    Bed Mobility scooting/bridging;supine-sit;sit-supine  -CS      Scooting/Bridging [place] (Bed Mobility) dependent (less than 25% patient effort);2 person assist;verbal cues  -CS     Supine-Sit [place] (Bed Mobility) maximum assist (25% patient effort);2 person assist;verbal cues;nonverbal cues (demo/gesture)  -CS     Sit-Supine [place] (Bed Mobility) maximum assist (25% patient effort);2 person assist;verbal cues;nonverbal cues (demo/gesture)  -CS     Assistive Device (Bed Mobility) bed rails;head of bed elevated  -CS     Comment, (Bed Mobility) cues for sequencing with increased time to complete; sling donned once sitting EOB  -CS       Row Name 10/03/23 1359          Bed-Chair Transfer    Bed-Chair [place] (Transfers) unable to assess  -CS       Row Name 10/03/23 1359          Sit-Stand Transfer    Sit-Stand [place] (Transfers) moderate assist (50% patient effort);maximum assist (25% patient effort);2 person assist;verbal cues;nonverbal cues (demo/gesture)  -CS     Assistive Device (Sit-Stand Transfers) other (see comments)  L UE HHA  -CS     Comment, (Sit-Stand Transfer) x 3 from EOB; heavy flexed posture and unable to correct; unable to weight shift to advance B LE so further mobility deferred  -CS       Row Name 10/03/23 1359          Gait/Stairs (Locomotion)    [place] Level (Gait) unable to assess  -CS       Row Name 10/03/23 1359          Mobility    Extremity Weight-bearing Status right upper extremity  -CS     Right Upper Extremity (Weight-bearing Status) non weight-bearing (NWB)  -CS               User Key  (r) = Recorded By, (t) = Taken By, (c) = Cosigned By      Initials Name Provider Type    Terrie Ding PT Physical Therapist                   Obj/Interventions       Row Name 10/03/23 1400          Balance    Balance Assessment sitting static balance;sitting dynamic balance;standing static balance  -CS     Static Sitting Balance standby assist;contact guard  -CS     Dynamic Sitting Balance minimal assist  -CS     Position,  Sitting Balance unsupported;sitting edge of bed  -CS     Static Standing Balance moderate assist;maximum assist;2-person assist  -CS     Position/Device Used, Standing Balance supported;other (see comments)  L UE HHA  -CS     Comment, Balance L lateral lean while sitting EOB; eventually SBA after cues for upright posture  -CS               User Key  (r) = Recorded By, (t) = Taken By, (c) = Cosigned By      Initials Name Provider Type    CS Terrie Lopez, PT Physical Therapist                   Goals/Plan    No documentation.                  Clinical Impression       Row Name 10/03/23 1401          Pain    Pain Intervention(s) Rest;Repositioned  -CS     Additional Documentation Pain Scale: FACES Pre/Post-Treatment (Group)  -CS       Row Name 10/03/23 1401          Pain Scale: FACES Pre/Post-Treatment    Pain: FACES Scale, Pretreatment 0-->no hurt  -CS     Posttreatment Pain Rating 4-->hurts little more  -CS     Pain Location - Side/Orientation Right  -CS     Pain Location upper  -CS     Pain Location - extremity  -CS       Row Name 10/03/23 1401          Plan of Care Review    Plan of Care Reviewed With patient  -CS     Progress improving  -CS     Outcome Evaluation Pt received in bed upon arrival and agreeable to PT. Pt with noted confusion but able to answer questions with slow processing. Pt required max A x 2 for bed mobility. Cues provided for sequencing with increased time to complete. Sling donned on R UE once sitting EOB. Pt stood from EOB 3x requiring mod/max A x 2 c L UE HHA. Pt demo's a flexed posture and fatigues quickly only able to stand roughly 10-15 seconds on each attempt. Pt unable to weight-shift to advance B LE so further mobility deferred. Pt returned to bed with R UE elevated. Pt c/o nausea with activity - RN notified. Pt will continue to benefit from skilled PT to address strength, endurance, and functional mobility.  -CS       Row Name 10/03/23 1401          Therapy Assessment/Plan (PT)     Criteria for Skilled Interventions Met (PT) yes;meets criteria  -CS     Therapy Frequency (PT) 5 times/wk  -CS       Row Name 10/03/23 1401          Positioning and Restraints    Pre-Treatment Position in bed  -CS     Post Treatment Position bed  -CS     In Bed notified nsg;fowlers;call light within reach;encouraged to call for assist;exit alarm on;RUE elevated;heels elevated  -CS               User Key  (r) = Recorded By, (t) = Taken By, (c) = Cosigned By      Initials Name Provider Type    Terrie Ding, PT Physical Therapist                   Outcome Measures       Row Name 10/03/23 1406 10/03/23 0832       How much help from another person do you currently need...    Turning from your back to your side while in flat bed without using bedrails? 2  -CS 3  -DD    Moving from lying on back to sitting on the side of a flat bed without bedrails? 2  -CS 1  -DD    Moving to and from a bed to a chair (including a wheelchair)? 2  -CS 1  -DD    Standing up from a chair using your arms (e.g., wheelchair, bedside chair)? 2  -CS 2  -DD    Climbing 3-5 steps with a railing? 1  -CS 1  -DD    To walk in hospital room? 1  -CS 1  -DD    AM-PAC 6 Clicks Score (PT) 10  -CS 9  -DD    Highest level of mobility 4 --> Transferred to chair/commode  -CS 3 --> Sat at edge of bed  -DD      Row Name 10/03/23 1406          Functional Assessment    Outcome Measure Options AM-PAC 6 Clicks Basic Mobility (PT)  -CS               User Key  (r) = Recorded By, (t) = Taken By, (c) = Cosigned By      Initials Name Provider Type    Terrie Ding, PT Physical Therapist    Nathalie Mchugh, RN Registered Nurse                                 Physical Therapy Education       Title: PT OT SLP Therapies (In Progress)       Topic: Physical Therapy (In Progress)       Point: Mobility training (In Progress)       Learning Progress Summary             Patient Acceptance, E,TB, NR by ONEYDA at 10/3/2023 1406                         Point: Home exercise  program (Not Started)       Learner Progress:  Not documented in this visit.              Point: Body mechanics (In Progress)       Learning Progress Summary             Patient Acceptance, E,TB, NR by CS at 10/3/2023 1406                         Point: Precautions (In Progress)       Learning Progress Summary             Patient Acceptance, E,TB, NR by CS at 10/3/2023 1406    Acceptance, E, NR by MG at 10/2/2023 1320                                         User Key       Initials Effective Dates Name Provider Type Discipline    MG 05/24/22 -  Allison Castañeda, PT Physical Therapist PT    CS 09/22/22 -  Terrie Lopez, PT Physical Therapist PT                  PT Recommendation and Plan     Plan of Care Reviewed With: patient  Progress: improving  Outcome Evaluation: Pt received in bed upon arrival and agreeable to PT. Pt with noted confusion but able to answer questions with slow processing. Pt required max A x 2 for bed mobility. Cues provided for sequencing with increased time to complete. Sling donned on R UE once sitting EOB. Pt stood from EOB 3x requiring mod/max A x 2 c L UE HHA. Pt demo's a flexed posture and fatigues quickly only able to stand roughly 10-15 seconds on each attempt. Pt unable to weight-shift to advance B LE so further mobility deferred. Pt returned to bed with R UE elevated. Pt c/o nausea with activity - RN notified. Pt will continue to benefit from skilled PT to address strength, endurance, and functional mobility.     Time Calculation:         PT Charges       Row Name 10/03/23 1406             Time Calculation    Start Time 1340  -CS      Stop Time 1356  -CS      Time Calculation (min) 16 min  -CS      PT Received On 10/03/23  -      PT - Next Appointment 10/04/23  -CS         Time Calculation- PT    Total Timed Code Minutes- PT 14 minute(s)  -CS         Timed Charges    52383 - PT Therapeutic Activity Minutes 14  -CS         Total Minutes    Timed Charges Total Minutes 14  -CS        Total Minutes 14  -CS                User Key  (r) = Recorded By, (t) = Taken By, (c) = Cosigned By      Initials Name Provider Type    CS Terrie Lopez, PT Physical Therapist                  Therapy Charges for Today       Code Description Service Date Service Provider Modifiers Qty    [id number]  PT THERAPEUTIC ACT EA 15 MIN 10/3/2023 Terrie Lopez, PT GP 1            PT G-Codes  Outcome Measure Options: AM-PAC 6 Clicks Basic Mobility (PT)  AM-PAC 6 Clicks Score (PT): 10  AM-PAC 6 Clicks Score (OT): 6  PT Discharge Summary  Anticipated Discharge Disposition (PT): skilled nursing facility    Terrie Lopez PT  10/3/2023

## 2023-10-03 NOTE — PLAN OF CARE
Goal Outcome Evaluation:         Patient admitted with a fracture to the right humerus sustained from a fall.  RUE remains in a sling.  RUE remains elevated. Voiding per jean carlos.,  Accumax is in place.  Patient is repositioned q2hrs.  Patient receives hydralazine q6 hrs for hypertension. Referrals sent [facility], and [facility].  No signs of distress noted.  Will continue to monitor and update accordingly.

## 2023-10-03 NOTE — PROGRESS NOTES
"    DAILY PROGRESS NOTE  [facility]    Patient Identification:  Name: Joanna Johnson  Age: 89 y.o.  Sex: female  :  1929  MRN: [id number]         Primary Care Physician: Serina Hannon APRN    Subjective:  No new changes.     Scheduled Meds:aspirin, 81 mg, Oral, Nightly  atorvastatin, 80 mg, Oral, Nightly  dilTIAZem, 120 mg, Oral, Daily  docusate sodium, 100 mg, Oral, BID  enoxaparin, 30 mg, Subcutaneous, Q24H  famotidine, 20 mg, Oral, Daily  hydrALAZINE, 25 mg, Oral, Q6H  levothyroxine, 50 mcg, Oral, Daily  losartan, 25 mg, Oral, Q24H  senna-docusate sodium, 2 tablet, Oral, BID      Continuous Infusions:     Vital signs in last 24 hours:  Temp:  [97.3 °F (36.3 °C)-97.9 °F (36.6 °C)] 97.3 °F (36.3 °C)  Heart Rate:  [52-71] 52  Resp:  [16] 16  BP: (144-189)/() 157/95    Intake/Output:    Intake/Output Summary (Last 24 hours) at 10/3/2023 1321  Last data filed at 10/3/2023 1303  Gross per 24 hour   Intake 415 ml   Output 600 ml   Net -185 ml       Exam:  /95 (BP Location: Left arm, Patient Position: Lying)   Pulse 52   Temp 97.3 °F (36.3 °C) (Oral)   Resp 16   Ht 160 cm (63\")   Wt 62.6 kg (138 lb)   SpO2 96%   BMI 24.45 kg/m²     General Appearance:    Advanced dementia, alert though flat affect                          Head:    Normocephalic, without obvious abnormality, atraumatic                           Eyes:    PERRL, conjunctivae/corneas clear, EOM's intact, both eyes                         Throat:   Oral mucosa pink and moist                           Neck:   Supple, no JVD                         Lungs:    Decreased bases otherwise clear to auscultation bilaterally, respirations unlabored                 Chest Wall:    No tenderness or deformity                          Heart:    Regular rate and rhythm, S1 and S2 normal                  Abdomen:     Soft, nontender, bowel sounds active, no masses, no organomegaly                  Extremities: Bruising noted at " JOSSELINE.             Data Review:  Labs in chart were reviewed.    Assessment:  Active Hospital Problems    Diagnosis  POA    **Proximal humerus fracture [S42.209A]  Yes    Pain in joint, upper arm, right [M25.521]  Unknown    Dementia with behavioral disturbance [F03.918]  Unknown    HTN (hypertension) [I10]  Unknown      Resolved Hospital Problems   No resolved problems to display.       Plan:    Right proximal humerus fracture with impaction              -Orthopedics has evaluated -no surgical plans noted              -Fall precautions  -P.o. pain control  -lovenox for dvt ppx     HTN              -Labile due to pain              -Losartan is new to her regimen and hydralazine frequency has been increased with resumption of diltiazem     Dementia- complicating aspects of care      CKD 3a -stable and improved creatinine down to 1.19 with normal electrolytes     Add Pepcid for GI prophylaxis         Disposition -will need assistance of CCP for postoperative rehab/long-term care -PT/OT pending   -Likely ready in next 24 to 48 hours         Stoney Dobbins MD  10/3/2023  13:21 EDT

## 2023-10-04 PROCEDURE — 25010000002 ENOXAPARIN PER 10 MG: Performed by: HOSPITALIST

## 2023-10-04 RX ORDER — HYDRALAZINE HYDROCHLORIDE 50 MG/1
50 TABLET, FILM COATED ORAL EVERY 6 HOURS SCHEDULED
Status: DISCONTINUED | OUTPATIENT
Start: 2023-10-04 | End: 2023-10-06 | Stop reason: HOSPADM

## 2023-10-04 RX ADMIN — HYDRALAZINE HYDROCHLORIDE 25 MG: 25 TABLET ORAL at 06:12

## 2023-10-04 RX ADMIN — SENNOSIDES AND DOCUSATE SODIUM 2 TABLET: 50; 8.6 TABLET ORAL at 20:13

## 2023-10-04 RX ADMIN — DOCUSATE SODIUM 100 MG: 100 CAPSULE, LIQUID FILLED ORAL at 08:05

## 2023-10-04 RX ADMIN — ATORVASTATIN CALCIUM 80 MG: 20 TABLET, FILM COATED ORAL at 20:13

## 2023-10-04 RX ADMIN — ENOXAPARIN SODIUM 30 MG: 100 INJECTION SUBCUTANEOUS at 09:12

## 2023-10-04 RX ADMIN — HYDROCODONE BITARTRATE AND ACETAMINOPHEN 1 TABLET: 5; 325 TABLET ORAL at 20:13

## 2023-10-04 RX ADMIN — HYDRALAZINE HYDROCHLORIDE 50 MG: 50 TABLET ORAL at 17:03

## 2023-10-04 RX ADMIN — HYDRALAZINE HYDROCHLORIDE 25 MG: 25 TABLET ORAL at 00:15

## 2023-10-04 RX ADMIN — FAMOTIDINE 20 MG: 20 TABLET ORAL at 08:05

## 2023-10-04 RX ADMIN — SENNOSIDES AND DOCUSATE SODIUM 2 TABLET: 50; 8.6 TABLET ORAL at 08:05

## 2023-10-04 RX ADMIN — DILTIAZEM HYDROCHLORIDE 120 MG: 60 TABLET, FILM COATED ORAL at 08:05

## 2023-10-04 RX ADMIN — LEVOTHYROXINE SODIUM 50 MCG: 50 TABLET ORAL at 08:07

## 2023-10-04 RX ADMIN — LOSARTAN POTASSIUM 25 MG: 25 TABLET, FILM COATED ORAL at 08:07

## 2023-10-04 RX ADMIN — HYDRALAZINE HYDROCHLORIDE 50 MG: 50 TABLET ORAL at 12:28

## 2023-10-04 RX ADMIN — BISACODYL 5 MG: 5 TABLET, COATED ORAL at 20:13

## 2023-10-04 RX ADMIN — ASPIRIN 81 MG: 81 TABLET, COATED ORAL at 20:12

## 2023-10-04 NOTE — SIGNIFICANT NOTE
10/04/23 1439   OTHER   Discipline physical therapist   Rehab Time/Intention   Session Not Performed other (see comments)  (PT treatment session attempted this afternoon, however pt. with very limited interaction, and difficulty keeping eyes open. Will follow up next date as appropriate.)   Therapy Assessment/Plan (PT)   Criteria for Skilled Interventions Met (PT) yes;meets criteria   Recommendation   PT - Next Appointment 10/05/23

## 2023-10-04 NOTE — PLAN OF CARE
Goal Outcome Evaluation:           Progress: improving  Outcome Evaluation: Pt admitted 9-30 for fall at memory care facility and fx R humerus, non operable and sling in place. Pt alert to self and place, hx of dementia. Pt VSS, increased BP since admission, voiding per purewick/brief, no BM since admission given oral laxative overnight, pain controlled with prn oral meds, pt turned Q2, up with max assistx2 due to not being able to follow commands well and increased pain. Pt plan pending precert to rehab

## 2023-10-04 NOTE — PLAN OF CARE
Goal Outcome Evaluation:      Patient admitted with a fracture to the right humerus sustained from a fall.  RUE remains in a sling.  RUE remains elevated. Voiding per jean carlos.,  Accumax is in place.  Patient is repositioned q2hrs.  Family at bedside. Patient receives hydralazine q6 hrs for hypertension. Referrals sent [facility], and [facility].  No signs of distress noted.  Will continue to monitor and update accordingly.

## 2023-10-04 NOTE — PROGRESS NOTES
"    DAILY PROGRESS NOTE  [facility]    Patient Identification:  Name: Joanna Johnson  Age: 89 y.o.  Sex: female  :  1929  MRN: [id number]         Primary Care Physician: Serina Hannon APRN    Subjective:  No acute events overnight.  Not very interactive.  Blood pressure is elevated.          Scheduled Meds:aspirin, 81 mg, Oral, Nightly  atorvastatin, 80 mg, Oral, Nightly  dilTIAZem, 120 mg, Oral, Daily  docusate sodium, 100 mg, Oral, BID  enoxaparin, 30 mg, Subcutaneous, Q24H  famotidine, 20 mg, Oral, Daily  hydrALAZINE, 50 mg, Oral, Q6H  levothyroxine, 50 mcg, Oral, Daily  losartan, 25 mg, Oral, Q24H  senna-docusate sodium, 2 tablet, Oral, BID      Continuous Infusions:     Vital signs in last 24 hours:  Temp:  [97 °F (36.1 °C)-97.9 °F (36.6 °C)] 97.5 °F (36.4 °C)  Heart Rate:  [61-70] 63  Resp:  [16-17] 17  BP: (141-195)/() 141/80    Intake/Output:    Intake/Output Summary (Last 24 hours) at 10/4/2023 1256  Last data filed at 10/4/2023 0847  Gross per 24 hour   Intake 680 ml   Output 600 ml   Net 80 ml       Exam:  /80 (BP Location: Left arm, Patient Position: Lying)   Pulse 63   Temp 97.5 °F (36.4 °C) (Oral)   Resp 17   Ht 160 cm (63\")   Wt 62.6 kg (138 lb)   SpO2 96%   BMI 24.45 kg/m²     General Appearance:    Advanced dementia, alert though flat affect                          Head:    Normocephalic, without obvious abnormality, atraumatic                           Eyes:    PERRL, conjunctivae/corneas clear, EOM's intact, both eyes                         Throat:   Oral mucosa pink and moist                           Neck:   Supple, no JVD                         Lungs:    Decreased bases otherwise clear to auscultation bilaterally, respirations unlabored                 Chest Wall:    No tenderness or deformity                          Heart:    Regular rate and rhythm, S1 and S2 normal                  Abdomen:     Soft, nontender, bowel sounds active, no " masses, no organomegaly                  Extremities: Bruising noted at RUE.            Exam reviewed and updated on 10/4/2023     Data Review:  Labs in chart were reviewed.    Assessment:  Active Hospital Problems    Diagnosis  POA    **Proximal humerus fracture [S42.209A]  Yes    Pain in joint, upper arm, right [M25.521]  Unknown    Dementia with behavioral disturbance [F03.918]  Unknown    HTN (hypertension) [I10]  Unknown      Resolved Hospital Problems   No resolved problems to display.       Plan:    Right proximal humerus fracture with impaction              -Orthopedics has evaluated -no surgical plans noted              -Fall precautions  -P.o. pain control  -lovenox for dvt ppx     HTN              -Labile due to pain              -Losartan is new to her regimen and hydralazine frequency has been increased with resumption of diltiazem   -Increased dose of hydralazine     Dementia- complicating aspects of care      CKD 3a -stable and improved creatinine down to 1.19 with normal electrolytes     Add Pepcid for GI prophylaxis         Disposition -will need assistance of CCP for postoperative rehab/long-term care -PT/OT pending   Pending placement         Stoney Dobbins MD  10/4/2023  12:56 EDT

## 2023-10-05 PROBLEM — Z66 DNR (DO NOT RESUSCITATE): Status: ACTIVE | Noted: 2023-10-05

## 2023-10-05 LAB
ANION GAP SERPL CALCULATED.3IONS-SCNC: 14 MMOL/L (ref 5–15)
BUN SERPL-MCNC: 34 MG/DL (ref 8–23)
BUN/CREAT SERPL: 29.1 (ref 7–25)
CALCIUM SPEC-SCNC: 9 MG/DL (ref 8.2–9.6)
CHLORIDE SERPL-SCNC: 106 MMOL/L (ref 98–107)
CO2 SERPL-SCNC: 21 MMOL/L (ref 22–29)
CREAT SERPL-MCNC: 1.17 MG/DL (ref 0.57–1)
DEPRECATED RDW RBC AUTO: 43.9 FL (ref 37–54)
EGFRCR SERPLBLD CKD-EPI 2021: 43.3 ML/MIN/1.73
ERYTHROCYTE [DISTWIDTH] IN BLOOD BY AUTOMATED COUNT: 13.4 % (ref 12.3–15.4)
GLUCOSE SERPL-MCNC: 101 MG/DL (ref 65–99)
HCT VFR BLD AUTO: 37.6 % (ref 34–46.6)
HGB BLD-MCNC: 12.6 G/DL (ref 12–15.9)
MCH RBC QN AUTO: 30.2 PG (ref 26.6–33)
MCHC RBC AUTO-ENTMCNC: 33.5 G/DL (ref 31.5–35.7)
MCV RBC AUTO: 90.2 FL (ref 79–97)
PLATELET # BLD AUTO: 242 10*3/MM3 (ref 140–450)
PMV BLD AUTO: 10.5 FL (ref 6–12)
POTASSIUM SERPL-SCNC: 3.6 MMOL/L (ref 3.5–5.2)
RBC # BLD AUTO: 4.17 10*6/MM3 (ref 3.77–5.28)
SODIUM SERPL-SCNC: 141 MMOL/L (ref 136–145)
WBC NRBC COR # BLD: 9.31 10*3/MM3 (ref 3.4–10.8)

## 2023-10-05 PROCEDURE — 80048 BASIC METABOLIC PNL TOTAL CA: CPT | Performed by: STUDENT IN AN ORGANIZED HEALTH CARE EDUCATION/TRAINING PROGRAM

## 2023-10-05 PROCEDURE — 25010000002 ENOXAPARIN PER 10 MG: Performed by: HOSPITALIST

## 2023-10-05 PROCEDURE — 85027 COMPLETE CBC AUTOMATED: CPT | Performed by: STUDENT IN AN ORGANIZED HEALTH CARE EDUCATION/TRAINING PROGRAM

## 2023-10-05 RX ORDER — HYDROCODONE BITARTRATE AND ACETAMINOPHEN 5; 325 MG/1; MG/1
1 TABLET ORAL EVERY 8 HOURS PRN
Qty: 9 TABLET | Refills: 0 | Status: SHIPPED | OUTPATIENT
Start: 2023-10-05 | End: 2023-10-08

## 2023-10-05 RX ORDER — POLYETHYLENE GLYCOL 3350 17 G/17G
17 POWDER, FOR SOLUTION ORAL DAILY PRN
Start: 2023-10-05

## 2023-10-05 RX ORDER — AMOXICILLIN 250 MG
1 CAPSULE ORAL 2 TIMES DAILY
Qty: 60 TABLET | Refills: 0 | Status: SHIPPED | OUTPATIENT
Start: 2023-10-05 | End: 2023-11-04

## 2023-10-05 RX ORDER — LOSARTAN POTASSIUM 25 MG/1
25 TABLET ORAL
Qty: 30 TABLET | Refills: 0 | Status: SHIPPED | OUTPATIENT
Start: 2023-10-06 | End: 2023-11-05

## 2023-10-05 RX ADMIN — LEVOTHYROXINE SODIUM 50 MCG: 50 TABLET ORAL at 09:06

## 2023-10-05 RX ADMIN — HYDRALAZINE HYDROCHLORIDE 50 MG: 50 TABLET ORAL at 23:53

## 2023-10-05 RX ADMIN — SENNOSIDES AND DOCUSATE SODIUM 2 TABLET: 50; 8.6 TABLET ORAL at 09:06

## 2023-10-05 RX ADMIN — ATORVASTATIN CALCIUM 80 MG: 20 TABLET, FILM COATED ORAL at 22:04

## 2023-10-05 RX ADMIN — FAMOTIDINE 20 MG: 20 TABLET ORAL at 09:06

## 2023-10-05 RX ADMIN — ASPIRIN 81 MG: 81 TABLET, COATED ORAL at 22:04

## 2023-10-05 RX ADMIN — LOSARTAN POTASSIUM 25 MG: 25 TABLET, FILM COATED ORAL at 09:06

## 2023-10-05 RX ADMIN — DILTIAZEM HYDROCHLORIDE 120 MG: 60 TABLET, FILM COATED ORAL at 09:06

## 2023-10-05 RX ADMIN — SENNOSIDES AND DOCUSATE SODIUM 2 TABLET: 50; 8.6 TABLET ORAL at 22:04

## 2023-10-05 RX ADMIN — HYDRALAZINE HYDROCHLORIDE 50 MG: 50 TABLET ORAL at 00:19

## 2023-10-05 RX ADMIN — HYDROCODONE BITARTRATE AND ACETAMINOPHEN 1 TABLET: 5; 325 TABLET ORAL at 22:04

## 2023-10-05 RX ADMIN — HYDRALAZINE HYDROCHLORIDE 50 MG: 50 TABLET ORAL at 11:23

## 2023-10-05 RX ADMIN — ENOXAPARIN SODIUM 30 MG: 100 INJECTION SUBCUTANEOUS at 09:06

## 2023-10-05 RX ADMIN — HYDRALAZINE HYDROCHLORIDE 50 MG: 50 TABLET ORAL at 05:31

## 2023-10-05 RX ADMIN — BISACODYL 10 MG: 10 SUPPOSITORY RECTAL at 14:39

## 2023-10-05 RX ADMIN — POLYETHYLENE GLYCOL 3350 17 G: 17 POWDER, FOR SOLUTION ORAL at 22:04

## 2023-10-05 RX ADMIN — HYDROCODONE BITARTRATE AND ACETAMINOPHEN 1 TABLET: 5; 325 TABLET ORAL at 14:58

## 2023-10-05 RX ADMIN — HYDRALAZINE HYDROCHLORIDE 50 MG: 50 TABLET ORAL at 17:30

## 2023-10-05 NOTE — PLAN OF CARE
Goal Outcome Evaluation:  Plan of Care Reviewed With: patient           Outcome Evaluation: JORDAN. NVI. R humerus fx. Dementia AOx2- self and place. very soft spoken. nectar thick liquid. meds crushed in apple sauce. PW and breif. no BM while hospitalized. next docalax availble at 2000. wiating on precent to facility. pain managed with medications. feeder. plan to d/C when get placement for facility.

## 2023-10-05 NOTE — PROGRESS NOTES
Continued Stay Note  [facility]     Patient Name: Joanna Johnson  MRN: [id number]  Today's Date: 10/5/2023    Admit Date: 9/30/2023    Plan: SNF- referrals pending to [facility] (1st choice) and [facility] ( 2nd choice)   Discharge Plan       Row Name 10/05/23 1619       Plan    Plan Comments Spoke with daughter Saloni [phone] regarding d/c planning. No beds available at [facility], discussed options. Referrals sent to [facility], per Josy patient is approved with bed available today. [facility] transport at 5pm. Plan will be to d/c to SNF. Family agreeable with d/c plan.    Final Discharge Disposition Code 03 - skilled nursing facility (SNF)    Final Note [facility] SNF                   Discharge Codes    No documentation.                 Expected Discharge Date and Time       Expected Discharge Date Expected Discharge Time    Oct 5, 2023               Amisha Mercedes RN

## 2023-10-05 NOTE — NURSING NOTE
Pt unable to D/C to [facility] because pt has not had bowel movement in less than 3 days. RN was not made aware of transportation plans or that pt was D/C today. Oral laxatives and Suppository was given earlier in the day but not results yet.

## 2023-10-05 NOTE — PLAN OF CARE
Goal Outcome Evaluation:           Progress: no change  Outcome Evaluation: Pt admitted 9/30 for fx of right humerus non surgical, arm in sling. Pt alert to self and place only, hx of dementia and from memory care facility. Pt has had high BP entire admission other vital signs stable, voiding per purewick, no BM since admission given oral laxative overnight, encouraging pt to eat and does eat if encouraged, oral intake has improved over past couple night. Pt up with max x2 assist. Pt plan pending rehab placement.

## 2023-10-05 NOTE — DISCHARGE SUMMARY
Patient Name: Joanna Johnson  : 1929  MRN: [id number]    Date of Admission: 2023  Date of Discharge:  10/5/2023  Primary Care Physician: Serina Hannon APRN      Chief Complaint:   Shoulder Pain and Fall      Discharge Diagnoses     Active Hospital Problems    Diagnosis  POA    **Proximal humerus fracture [S42.209A]  Yes    DNR (do not resuscitate) [Z66]  Unknown    Pain in joint, upper arm, right [M25.521]  Unknown    Dementia with behavioral disturbance [F03.918]  Unknown    HTN (hypertension) [I10]  Unknown      Resolved Hospital Problems   No resolved problems to display.        Hospital Course     Addendum 10/6: Patient had a BM overnight and is ready for dc.     Ms. Johnson is a 89 y.o. female with a history of hypertension, CKD 3A, dementia presenting with mechanical fall found to have a right proximal humerus fracture with impaction.  Ortho was evaluated, conservative management, no surgical indication at this time.  Patient on pain medications.  Patient has been hypertensive while here, some of this is secondary to her pain.  Continue home diltiazem.  Losartan is new for her and she will need to BMP in 1 week to ensure stable kidney function.  Plan to discharge to skilled nursing facility today.    At the time of discharge patient was told to take all medications as prescribed, keep all follow-up appointments, and call their doctor or return to the hospital with any worsening or concerning symptoms.    Day of Discharge     Subjective:  Patient lying comfortably in bed.  Patient without any complaints.  Patient denies any pain.  Patient tolerating a diet.  She is hard of hearing    Review of Systems   Constitutional:  Negative for chills and fever.   Respiratory:  Negative for cough and shortness of breath.    Cardiovascular:  Negative for chest pain and leg swelling.   Gastrointestinal:  Negative for abdominal pain, diarrhea and nausea.     Physical Exam:  Temp:  [97.9 °F (36.6 °C)-98.7  °F (37.1 °C)] 98.4 °F (36.9 °C)  Heart Rate:  [52-62] 58  Resp:  [16-17] 16  BP: (123-165)/(64-87) 123/64  Body mass index is 24.45 kg/m².  Physical Exam  Constitutional:       General: She is not in acute distress.     Appearance: She is ill-appearing (Chronically).      Comments: Elderly, frail   Cardiovascular:      Rate and Rhythm: Normal rate and regular rhythm.   Pulmonary:      Effort: Pulmonary effort is normal. No respiratory distress.   Abdominal:      General: Abdomen is flat. There is no distension.      Tenderness: There is no abdominal tenderness.   Musculoskeletal:         General: No swelling or deformity. Normal range of motion.      Comments: Right arm in sling   Skin:     General: Skin is warm and dry.   Neurological:      General: No focal deficit present.      Mental Status: She is alert. Mental status is at baseline.       Consultants     Consult Orders (all) (From admission, onward)       Start     Ordered    10/05/23 1134  Inpatient Nutrition Consult  Once        Provider:  (Not yet assigned)    10/05/23 1134    10/01/23 0000  Inpatient Case Management  Consult  Once        Provider:  (Not yet assigned)    09/30/23 2048 09/30/23 2232  Inpatient Spiritual Care Consult  Once        Provider:  (Not yet assigned)    09/30/23 2231 09/30/23 2049  Inpatient Orthopedic Surgery Consult  Once        Specialty:  Orthopedic Surgery  Provider:  (Not yet assigned)    09/30/23 2048                  Procedures     Imaging Results (All)       Procedure Component Value Units Date/Time    CT Head Without Contrast [[id number]] Collected: 09/30/23 1940     Updated: 09/30/23 2004    Narrative:      EXAM: CT HEAD WO CONTRAST-, CT CERVICAL SPINE WO CONTRAST-     CLINICAL HISTORY: Fall with possible head injury     TECHNIQUE: CT scan of the head was obtained with 3 mm axial soft tissue  algorithm images. No intravenous contrast was administered. Sagittal and  coronal reconstructions were  obtained.     COMPARISON: None available.     FINDINGS: Tiny right frontal subcutaneous hematoma. No underlying  calvarial fracture or intracranial hemorrhage. Generalized cortical  involution without lobar predominance. Ex vacuo dilatation of the  lateral and third ventricles without hydrocephalus. Confluent  periventricular and subcortical white matter hypoattenuation without  mass effect, suggesting advanced small vessel ischemic changes. No CT  evidence of acute territorial infarction. No midline shift or mass  effect. No extra-axial collections. Clear visualized portions of the  paranasal sinuses and mastoid air cells.     Straightening of the normal cervical lordosis. Slight C4 on C5  anterolisthesis. Vertebral body heights are maintained without fracture.  Moderate and advanced disc degeneration throughout the cervical spine,  advanced at C5-C7. Bilateral multilevel moderate advanced facet  arthropathy. No appreciable spinal canal stenosis. No prevertebral soft  tissue swelling.           Impression:      1. Tiny right frontal subcutaneous hematoma. No underlying calvarial  fracture or intracranial hemorrhage.  2. No cervical spine fracture or traumatic subluxation. Degenerative  changes as above.              Radiation dose reduction techniques were utilized, including automated  exposure control and exposure modulation based on body size.     This report was finalized on 9/30/2023 8:01 PM by Dr. Panfilo Bravo M.D.       CT Cervical Spine Without Contrast [[id number]] Collected: 09/30/23 1940     Updated: 09/30/23 2004    Narrative:      EXAM: CT HEAD WO CONTRAST-, CT CERVICAL SPINE WO CONTRAST-     CLINICAL HISTORY: Fall with possible head injury     TECHNIQUE: CT scan of the head was obtained with 3 mm axial soft tissue  algorithm images. No intravenous contrast was administered. Sagittal and  coronal reconstructions were obtained.     COMPARISON: None available.     FINDINGS: Tiny right frontal  subcutaneous hematoma. No underlying  calvarial fracture or intracranial hemorrhage. Generalized cortical  involution without lobar predominance. Ex vacuo dilatation of the  lateral and third ventricles without hydrocephalus. Confluent  periventricular and subcortical white matter hypoattenuation without  mass effect, suggesting advanced small vessel ischemic changes. No CT  evidence of acute territorial infarction. No midline shift or mass  effect. No extra-axial collections. Clear visualized portions of the  paranasal sinuses and mastoid air cells.     Straightening of the normal cervical lordosis. Slight C4 on C5  anterolisthesis. Vertebral body heights are maintained without fracture.  Moderate and advanced disc degeneration throughout the cervical spine,  advanced at C5-C7. Bilateral multilevel moderate advanced facet  arthropathy. No appreciable spinal canal stenosis. No prevertebral soft  tissue swelling.           Impression:      1. Tiny right frontal subcutaneous hematoma. No underlying calvarial  fracture or intracranial hemorrhage.  2. No cervical spine fracture or traumatic subluxation. Degenerative  changes as above.              Radiation dose reduction techniques were utilized, including automated  exposure control and exposure modulation based on body size.     This report was finalized on 9/30/2023 8:01 PM by Dr. Panfilo Bravo M.D.       XR Shoulder 2+ View Right [[id number]] Collected: 09/30/23 1943     Updated: 09/30/23 1948    Narrative:      X-RAY OF THE RIGHT HUMERUS; 2 VIEWS RIGHT SHOULDER     HISTORY: Pain     COMPARISON: None available.     FINDINGS:  The patient has an impacted fracture of the proximal right humerus. No  fracture of the distal humerus is seen. There is adjacent soft tissue  swelling. Degenerative changes are noted at the right AC joint.       Impression:      Impacted proximal right humeral fracture.     This report was finalized on 9/30/2023 7:45 PM by Dr. Alaniz  PITA Simmons.       XR Humerus Right [[id number]] Collected: 09/30/23 1943     Updated: 09/30/23 1948    Narrative:      X-RAY OF THE RIGHT HUMERUS; 2 VIEWS RIGHT SHOULDER     HISTORY: Pain     COMPARISON: None available.     FINDINGS:  The patient has an impacted fracture of the proximal right humerus. No  fracture of the distal humerus is seen. There is adjacent soft tissue  swelling. Degenerative changes are noted at the right AC joint.       Impression:      Impacted proximal right humeral fracture.     This report was finalized on 9/30/2023 7:45 PM by Dr. Katty Simmons M.D.               Pertinent Labs     Results from last 7 days   Lab Units 10/05/23  0511 10/01/23  0445 09/30/23  2033   WBC 10*3/mm3 9.31 8.82 9.41   HEMOGLOBIN g/dL 12.6 12.4 13.7   PLATELETS 10*3/mm3 242 238 245     Results from last 7 days   Lab Units 10/05/23  0511 10/01/23  0445 09/30/23  2033   SODIUM mmol/L 141 140 141   POTASSIUM mmol/L 3.6 3.8 3.7   CHLORIDE mmol/L 106 107 105   CO2 mmol/L 21.0* 23.0 25.7   BUN mg/dL 34* 21 21   CREATININE mg/dL 1.17* 1.19* 1.37*   GLUCOSE mg/dL 101* 123* 127*   Estimated Creatinine Clearance: 29.1 mL/min (A) (by C-G formula based on SCr of 1.17 mg/dL (H)).  Results from last 7 days   Lab Units 09/30/23 2033   ALBUMIN g/dL 3.7   BILIRUBIN mg/dL 0.3   ALK PHOS U/L 71   AST (SGOT) U/L 16   ALT (SGPT) U/L 10     Results from last 7 days   Lab Units 10/05/23  0511 10/01/23  0445 09/30/23  2033   CALCIUM mg/dL 9.0 8.6 8.8   ALBUMIN g/dL  --   --  3.7               Invalid input(s): LDLCALC        Test Results Pending at Discharge       Discharge Details        Discharge Medications        New Medications        Instructions Start Date   HYDROcodone-acetaminophen 5-325 MG per tablet  Commonly known as: NORCO   1 tablet, Oral, Every 8 Hours PRN      losartan 25 MG tablet  Commonly known as: COZAAR   25 mg, Oral, Every 24 Hours Scheduled   Start Date: October 6, 2023     polyethylene glycol 17 g  packet  Commonly known as: MIRALAX   17 g, Oral, Daily PRN      sennosides-docusate 8.6-50 MG per tablet  Commonly known as: PERICOLACE   1 tablet, Oral, 2 Times Daily, Hold for loose stool             Continue These Medications        Instructions Start Date   aspirin 81 MG EC tablet   81 mg, Oral, Nightly      atorvastatin 80 MG tablet  Commonly known as: LIPITOR   80 mg, Oral, Nightly      dilTIAZem  MG 12 hr capsule  Commonly known as: CARDIZEM SR   120 mg, Oral, 2 Times Daily      docusate sodium 100 MG capsule  Commonly known as: COLACE   100 mg, Oral, 2 Times Daily      hydrALAZINE 50 MG tablet  Commonly known as: APRESOLINE   50 mg, Oral, 2 Times Daily      levothyroxine 50 MCG tablet  Commonly known as: SYNTHROID, LEVOTHROID   50 mcg, Oral, Daily               Allergies   Allergen Reactions    Cephalexin Rash     Extensive morbilliform rash         Discharge Disposition:  Skilled Nursing Facility (DC - External)    Discharge Diet:  Diet Order   Procedures    Diet: Cardiac Diets; Healthy Heart (2-3 Na+); Feeding Assistance - Nursing; Texture: Mechanical Ground (NDD 2); Fluid Consistency: Nectar Thick       Discharge Activity:   As tolerated    CODE STATUS:    Code Status and Medical Interventions:   Ordered at: 09/30/23 2251     Medical Intervention Limits:    NO intubation (DNI)     Code Status (Patient has no pulse and is not breathing):    No CPR (Do Not Attempt to Resuscitate)     Medical Interventions (Patient has pulse or is breathing):    Limited Support       No future appointments.   Contact information for follow-up providers       Serina Hannon APRN .    Specialty: Nurse Practitioner  Contact information:  [address] [place] [address] [address]  [address]  [facility] [address] [phone]                       Contact information for after-discharge care       Destination       [facility] .    Service: Skilled Nursing  Contact information:  [address] [facility]  [address]  [phone]                                   Time Spent on Discharge:  Greater than 30 minutes      Devante Smalls MD  [facility]  10/05/23  13:03 EDT

## 2023-10-05 NOTE — DISCHARGE PLACEMENT REQUEST
"Joanna Johnson (89 y.o. Female)       Date of Birth   01/01/1929    Social Security Number       Address   [address] [place], [place] [address]    Home Phone   [phone]    MRN   [id number]       [employer]   Scientology    Marital Status                               Admission Date   9/30/23    Admission Type   Emergency    Admitting Provider   Jesi Dutton MD    Attending Provider   Devante Smalls MD    Department, Room/Bed   [address], P795/1       Discharge Date       Discharge Disposition   Skilled Nursing Facility (DC - External)    Discharge Destination                                 Attending Provider: Devante Smalls MD    Allergies: Cephalexin    Isolation: None   Infection: None   Code Status: No CPR    Ht: 160 cm (63\")   Wt: 62.6 kg (138 lb)    Admission Cmt: None   Principal Problem: Proximal humerus fracture [S42.209A]                   Active Insurance as of 9/30/2023       Primary Coverage       Payor Plan Insurance Group Employer/Plan Group    MEDICARE MEDICARE A & B        Payor Plan Address Payor Plan Phone Number Payor Plan Fax Number Effective Dates    [address] [phone]  12/1/1993 - None Entered    [facility] [id number]         Subscriber Name Subscriber Birth Date Member ID       JOANNA JOHNSON 1/1/1929 [id number]               Secondary Coverage       Payor Plan Insurance Group Employer/Plan Group    [facility] SUPP [id number]       Payor Plan Address Payor Plan Phone Number Payor Plan Fax Number Effective Dates    [address]   12/1/2016 - None Entered    [facility] [id number]         Subscriber Name Subscriber Birth Date Member ID       JOANNA JOHNSON 1/1/1929 [id number]                     Emergency Contacts        (Rel.) Home Phone Work Phone Mobile Phone    Jaclyn Oliver (Daughter) [phone]            "

## 2023-10-05 NOTE — CONSULTS
"Nutrition Services    Patient Name:  Joanna Johnson  YOB: 1929  MRN: [id number]  Admit Date:  9/30/2023  Assessment Date:  10/05/23    Summary: Nutrition consult for Chronic poor po--  This is a 83 yo female who was admitted after a fall, s/p right frontal subcutaneous hematoma, hx of dementia  Pt on Msoft grd with NTL  25% po intake and pt is atotal feed  Spoke with RN    Plan/Recommendations  Will add magic cups TID To meals  Will continue to monitor po intake.      CLINICAL NUTRITION ASSESSMENT      Reason for Assessment Chronic Poor Intake      Diagnosis/Problem   S/p fall, right frontal subcutaneous hematoma, hx of dementia   Medical/Surgical History Past Medical History:   Diagnosis Date    Hepatitis     Hypertension     Stroke        History reviewed. No pertinent surgical history.     Anthropometrics        Current Height  Current Weight  BMI kg/m2 Height: 160 cm (63\")  Weight: 62.6 kg (138 lb) (09/30/23 1837)  Body mass index is 24.45 kg/m².   Adjusted BMI (if applicable)    BMI Category Normal/Healthy (18.4 - 24.9)   Ideal Body Weight (IBW) 115lb   Usual Body Weight (UBW)    Weight Trend Unknown   Weight History Wt Readings from Last 30 Encounters:   09/30/23 1837 62.6 kg (138 lb)      --  Labs       Pertinent Labs    Results from last 7 days   Lab Units 10/05/23  0511 10/01/23  0445 09/30/23  2033   SODIUM mmol/L 141 140 141   POTASSIUM mmol/L 3.6 3.8 3.7   CHLORIDE mmol/L 106 107 105   CO2 mmol/L 21.0* 23.0 25.7   BUN mg/dL 34* 21 21   CREATININE mg/dL 1.17* 1.19* 1.37*   CALCIUM mg/dL 9.0 8.6 8.8   BILIRUBIN mg/dL  --   --  0.3   ALK PHOS U/L  --   --  71   ALT (SGPT) U/L  --   --  10   AST (SGOT) U/L  --   --  16   GLUCOSE mg/dL 101* 123* 127*     Results from last 7 days   Lab Units 10/05/23  0511 10/01/23  0445 09/30/23  2033   HEMOGLOBIN g/dL 12.6   < > 13.7   HEMATOCRIT % 37.6   < > 40.8   WBC 10*3/mm3 9.31   < > 9.41   ALBUMIN g/dL  --   --  3.7    < > = values in this interval " not displayed.     Results from last 7 days   Lab Units 10/05/23  0511 10/01/23  0445 09/30/23 2033   PLATELETS 10*3/mm3 242 238 245     No results found for: COVID19  Lab Results   Component Value Date    HGBA1C 5.5 01/08/2019          Medications           Scheduled Medications aspirin, 81 mg, Oral, Nightly  atorvastatin, 80 mg, Oral, Nightly  dilTIAZem, 120 mg, Oral, Daily  docusate sodium, 100 mg, Oral, BID  enoxaparin, 30 mg, Subcutaneous, Q24H  famotidine, 20 mg, Oral, Daily  hydrALAZINE, 50 mg, Oral, Q6H  levothyroxine, 50 mcg, Oral, Daily  losartan, 25 mg, Oral, Q24H  senna-docusate sodium, 2 tablet, Oral, BID       Infusions     PRN Medications   acetaminophen    senna-docusate sodium **AND** polyethylene glycol **AND** bisacodyl **AND** bisacodyl    HYDROcodone-acetaminophen    melatonin    ondansetron **OR** ondansetron    [COMPLETED] Insert Peripheral IV **AND** sodium chloride     Physical Findings          General Findings confused, disoriented   Oral/Mouth Cavity tooth or teeth missing   Edema  3+ (moderate)   Gastrointestinal normoactive   Skin  bruising   Tubes/Drains/Lines none   NFPE Not indicated at this time   --  Current Nutrition Orders & Evaluation of Intake       Oral Nutrition     Food Allergies NKFA   Current PO Diet Diet: Cardiac Diets; Healthy Heart (2-3 Na+); Feeding Assistance - Nursing; Texture: Mechanical Ground (NDD 2); Fluid Consistency: Nectar Thick   Supplement    PO Evaluation     % PO Intake 25%    Factors Affecting Intake: chewing difficulty, decreased appetite, swallow impairment   --  PES STATEMENT / NUTRITION DIAGNOSIS      Nutrition Dx Problem  Problem: Inadequate Oral Intake, Biting/Chewing Difficulty, and Swallowing Difficulty  Etiology: Factors Affecting Nutrition - decrease appetite, difficulty chewing/swallowing    Signs/Symptoms: PO intake and Report/Observation     NUTRITION INTERVENTION / PLAN OF CARE      Intervention Goal(s) Reduce/improve symptoms, Disease  management/therapy, Tolerate PO , Increase intake, Maintain weight, and PO intake goal %: 75+         RD Intervention/Action Supplement provided, Encourage intake, Continue to monitor, and Other (comment): spoke with RN   --      Prescription/Orders:       PO Diet       Supplements Magic cups      Enteral Nutrition       Parenteral Nutrition    New Prescription Ordered? Yes   --      Monitor/Evaluation Per protocol   Discharge Plan/Needs Pending clinical course   --    RD to follow per protocol.      Electronically signed by:  Amisha Parra RD  10/05/23 15:20 EDT

## 2023-10-06 VITALS
HEIGHT: 63 IN | TEMPERATURE: 98 F | SYSTOLIC BLOOD PRESSURE: 150 MMHG | HEART RATE: 65 BPM | DIASTOLIC BLOOD PRESSURE: 79 MMHG | RESPIRATION RATE: 16 BRPM | WEIGHT: 138 LBS | OXYGEN SATURATION: 94 % | BODY MASS INDEX: 24.45 KG/M2

## 2023-10-06 PROCEDURE — 25010000002 ENOXAPARIN PER 10 MG: Performed by: HOSPITALIST

## 2023-10-06 RX ORDER — POLYETHYLENE GLYCOL 3350 17 G/17G
17 POWDER, FOR SOLUTION ORAL DAILY
Status: DISCONTINUED | OUTPATIENT
Start: 2023-10-06 | End: 2023-10-06 | Stop reason: HOSPADM

## 2023-10-06 RX ADMIN — LEVOTHYROXINE SODIUM 50 MCG: 50 TABLET ORAL at 10:45

## 2023-10-06 RX ADMIN — ACETAMINOPHEN 650 MG: 325 TABLET, FILM COATED ORAL at 10:45

## 2023-10-06 RX ADMIN — BISACODYL 5 MG: 5 TABLET, COATED ORAL at 05:41

## 2023-10-06 RX ADMIN — HYDRALAZINE HYDROCHLORIDE 50 MG: 50 TABLET ORAL at 05:41

## 2023-10-06 RX ADMIN — LOSARTAN POTASSIUM 25 MG: 25 TABLET, FILM COATED ORAL at 10:45

## 2023-10-06 RX ADMIN — FAMOTIDINE 20 MG: 20 TABLET ORAL at 10:45

## 2023-10-06 RX ADMIN — ENOXAPARIN SODIUM 30 MG: 100 INJECTION SUBCUTANEOUS at 10:45

## 2023-10-06 RX ADMIN — HYDROCODONE BITARTRATE AND ACETAMINOPHEN 1 TABLET: 5; 325 TABLET ORAL at 05:42

## 2023-10-06 RX ADMIN — DILTIAZEM HYDROCHLORIDE 120 MG: 60 TABLET, FILM COATED ORAL at 10:45

## 2023-10-06 RX ADMIN — HYDRALAZINE HYDROCHLORIDE 50 MG: 50 TABLET ORAL at 12:53

## 2023-10-06 NOTE — CASE MANAGEMENT/SOCIAL WORK
Continued Stay Note  [facility]     Patient Name: Joanna Johnson  MRN: [id number]  Today's Date: 10/6/2023    Admit Date: 9/30/2023    Plan: [facility] ([place], [place])   Discharge Plan       Row Name 10/06/23 0830       Plan    Plan [facility] ([place] [place]    Plan Comments Patient unable to dc last night due to not having a BM for several days. Spoke with nurse this AM and patient had a BM this morning. [facility] arranged for 1300. Message left for Josy/Katherine to update. Spoke with daughter, Jaclyn, by telephone and she is agreeable to dc plan. Nurse updated. Secure chat sent asking MD to add an addendum to the dc summary. Patient will dc to skilled bed at [facility] ([place] [place] via [facility].                   Discharge Codes    No documentation.                 Expected Discharge Date and Time       Expected Discharge Date Expected Discharge Time    Oct 6, 2023               Margo Friend RN

## 2023-10-06 NOTE — PROGRESS NOTES
"Physicians Statement of Medical Necessity for  Ambulance Transportation    GENERAL INFORMATION     Name: Joanna Johnson  YOB: 1929  Medicare #: see facesheet  Transport Date: 10/06/23  (Valid for round trips this date, or for scheduled repetitive trips for 60 days from the date signed below.)  Origin: [facility] 7P 795  Destination: [facility]  Is the Patient's stay covered under Medicare Part A (PPS/DRG?)Yes  Closest appropriate facility? Yes  If this a hosp-hosp transfer? No  Is this a hospice patient? No    MEDICAL NECESSITY QUESTIONAIRE    Ambulance Transportation is medically necessary only if other means of transportation are contraindicated or would be potentially harmful to the patient.  To meet this requirement, the patient must be either \"bed confined\" or suffer from a condition such that transport by means other than an ambulance is contraindicated by the patient's condition.  The following questions must be answered by the healthcare professional signing below for this form to be valid:     1) Describe the MEDICAL CONDITION (physical and/or mental) of this patient AT THE TIME OF AMBULANCE TRANSPORT that requires the patient to be transported in an ambulance, and why transport by other means is contraindicated by the patient's condition: post op  Past Medical History:   Diagnosis Date    Hepatitis     Hypertension     Stroke       History reviewed. No pertinent surgical history.   2) Is this patient \"bed confined\" as defined below?Yes   To be \"bed confined\" the patient must satisfy all three of the following criteria:  (1) unable to get up from bed without assistance; AND (2) unable to ambulate;  AND (3) unable to sit in a chair or wheelchair.  3) Can this patient safely be transported by car or wheelchair van (I.e., may safely sit during transport, without an attendant or monitoring?)No   4. In addition to completing questions 1-3 above, please check any of the following conditions " that apply*:          *Note: supporting documentation for any boxes checked must be maintained in the patient's medical records Non-healed fractures, Medical attendant required, Unable to tolerate seated position for time needed to transport, and Orthopedic device (backboard, halo, pins, traction, brace, wedge, etc.) required special handling during transport      SIGNATURE OF PHYSICIAN OR OTHER AUTHORIZED HEALTHCARE PROFESSIONAL    I certify that the above information is true and correct based on my evaluation of this patient, and represent that the patient requires transport by ambulance and that other forms of transport are contraindicated.  I understand that this information will be used by the Centers for Medicare and Medicaid Services (CMS) to support the determiniation of medical necessity for ambulance services, and I represent that I have personal knowledge of the patient's condition at the time of transport.    X   If this box is checked, I also certify that the patient is physically or mentally incapable of signing the ambulance service's claim form and that the institution with which I am affiliated has furnished care, services or assistance to the patient.  My signature below is made on behalf of the patient pursuant to 42 .36(b)(4). In accordance with 42 .37, the specific reason(s) that the patient is physically or mentally incapable of signing the claim for is as follows: post op    Signature of Physician or Healthcare Professional  Date/Time:   10/06/23 10:12 EDT      (For Scheduled repetitive transport, this form is not valid for transports performed more than 60 days after this date).                                                                                                                                            --------------------------------------------------------------------------------------------  Printed Name and Credentials of Physician or Authorized Healthcare  Professional     *Form must be signed by patient's attending physician for scheduled, repetitive transports,.  For non-repetitive ambulance transports, if unable to obtain the signature of the attending physician, any of the following may sign (please select below):     Physician  Clinical Nurse Specialist  Registered Nurse     Physician Assistant  Discharge Planner  Licensed Practical Nurse     Nurse Practitioner

## 2023-10-06 NOTE — CONSULTS
Visited patient after two other chaplains attempted to do spiritual care consult. Patient once again sleeping soundly.

## 2023-10-06 NOTE — PLAN OF CARE
Goal Outcome Evaluation:  Plan of Care Reviewed With: patient        Progress: no change  Outcome Evaluation: VSS, RA, SL, voiding per purewick, sling in place to right arm, no surgery planned, meds crushed in applesauce, nectar thick liquids, unable to educated R/T baseline dementia, no BM since admission-meds given, rehab on DC possible today

## 2023-10-06 NOTE — PROGRESS NOTES
Continued Stay Note  [facility]     Patient Name: Joanna Johnson  MRN: [id number]  Today's Date: 10/6/2023    Admit Date: 9/30/2023    Plan: [facility]   Discharge Plan       Row Name 10/06/23 1605       Plan    Final Discharge Disposition Code 03 - skilled nursing facility (SNF)    Final Note [facility]. [facility] at 1300. Pt and family agreeable with d/c plan                   Discharge Codes    No documentation.                 Expected Discharge Date and Time       Expected Discharge Date Expected Discharge Time    Oct 6, 2023               Amisha Mercedes RN